# Patient Record
Sex: FEMALE | Race: WHITE | Employment: UNEMPLOYED | URBAN - METROPOLITAN AREA
[De-identification: names, ages, dates, MRNs, and addresses within clinical notes are randomized per-mention and may not be internally consistent; named-entity substitution may affect disease eponyms.]

---

## 2017-02-15 DIAGNOSIS — F64.0 GENDER DYSPHORIA IN ADULT: Primary | ICD-10-CM

## 2017-02-15 NOTE — TELEPHONE ENCOUNTER
Controlled Substance Assessment and Plan  Indication: gender dysphoria  Medication and dose: Testosterone as listed in the medication list    Ready to print out and sing.     Gabriela Valdivia MD  [facility],  G2 Family Medicine Resident  #[id number]

## 2017-02-15 NOTE — TELEPHONE ENCOUNTER
Request for medication refill:    Date of last visit at clinic: 11-14-16    Please complete refill if appropriate and CLOSE ENCOUNTER.    Closing the encounter signifies the refill is complete.    If refill has been denied, please complete the smart phrase .smirefuse and route it to the [facility] RN TRIAGE pool to inform the patient and the pharmacy.    Cristina Mckeon

## 2017-02-16 ENCOUNTER — TELEPHONE (OUTPATIENT)
Dept: FAMILY MEDICINE | Facility: CLINIC | Age: 32
End: 2017-02-16

## 2017-02-16 RX ORDER — TESTOSTERONE CYPIONATE 200 MG/ML
80 INJECTION, SOLUTION INTRAMUSCULAR WEEKLY
Qty: 7 ML | Refills: 1 | Status: SHIPPED | OUTPATIENT
Start: 2017-02-16 | End: 2017-08-25

## 2017-02-16 NOTE — TELEPHONE ENCOUNTER
[facility] phone call message- patient requesting a refill:    Full Medication Name: testosterone cypionate (DEPOTESTOTERONE CYPIONATE) 200 MG/ML injection    Dose: Sig: Inject 0.4 mLs (80 mg) into the muscle once a week    Pharmacy confirmed as   [facility] - [place] - [address] [place] [address]  [address] [facility]  [place] [address]  Phone: [phone] Fax: [phone]  : Yes    Additional Comments: Patient requesting above refill.  States pharmacy was unable to fill prescription, and that they are unable to schedule a follow-up appointment at this time due to inactive insurance.  If refill is denied, patient would like a telephone call.    OK to leave a message on voice mail? Yes    Primary language: English      needed? No    Call taken on February 16, 2017 at 12:46 PM by Elizabeth Burton

## 2017-02-16 NOTE — TELEPHONE ENCOUNTER
Medication approved by PCP, script printed for preceptor, Dr. GABRIELLE Sanchez, to sign. Faxed to patient's pharmacy, fax successful.    Leslie Grove RN

## 2017-04-10 ENCOUNTER — OFFICE VISIT (OUTPATIENT)
Dept: FAMILY MEDICINE | Facility: CLINIC | Age: 32
End: 2017-04-10

## 2017-04-10 VITALS
HEART RATE: 83 BPM | SYSTOLIC BLOOD PRESSURE: 131 MMHG | OXYGEN SATURATION: 91 % | HEIGHT: 70 IN | BODY MASS INDEX: 23.34 KG/M2 | DIASTOLIC BLOOD PRESSURE: 94 MMHG | WEIGHT: 163 LBS | TEMPERATURE: 97.6 F | RESPIRATION RATE: 16 BRPM

## 2017-04-10 DIAGNOSIS — F12.20 CANNABIS DEPENDENCE (H): ICD-10-CM

## 2017-04-10 DIAGNOSIS — I10 BENIGN ESSENTIAL HYPERTENSION: ICD-10-CM

## 2017-04-10 DIAGNOSIS — L70.0 ACNE VULGARIS: ICD-10-CM

## 2017-04-10 DIAGNOSIS — F64.0 GENDER DYSPHORIA IN ADULT: Primary | ICD-10-CM

## 2017-04-10 LAB
BUN SERPL-MCNC: 13.5 MG/DL (ref 7–19)
CALCIUM SERPL-MCNC: 9.6 MG/DL (ref 8.5–10.1)
CHLORIDE SERPLBLD-SCNC: 102.1 MMOL/L (ref 98–110)
CO2 SERPL-SCNC: 30.7 MMOL/L (ref 20–32)
CREAT SERPL-MCNC: 1 MG/DL (ref 0.5–1)
GFR SERPL CREATININE-BSD FRML MDRD: 69.2 ML/MIN/1.7 M2
GLUCOSE SERPL-MCNC: 94.1 MG'DL (ref 70–99)
POTASSIUM SERPL-SCNC: 4 MMOL/DL (ref 3.3–4.5)
SODIUM SERPL-SCNC: 138.5 MMOL/L (ref 132.6–141.4)

## 2017-04-10 NOTE — PROGRESS NOTES
Preceptor Attestation:   Patient seen and discussed with the resident. Assessment and plan reviewed with resident and agreed upon.   Supervising Physician:  Tera Troy MD  [facility]

## 2017-04-10 NOTE — PROGRESS NOTES
"          ANABEL Cruz is a 31 year old individual that uses pronouns He/Him/His/Himself that presents today for follow up of:  masculinizing hormone therapy. Gender identity: male    Any special concerns today?  concerns about acne. Has breakouts mostly on the face which is very bothersome.     On hormones?  YES +++ Shot day of the week? Tuesday      Due for labs?  Yes      +++ Refills of meds needed?  No  ---    History reviewed. No pertinent surgical history.    Patient Active Problem List   Diagnosis     Gender dysphoria     Benign essential hypertension     Hx of substance abuse       Current Outpatient Prescriptions   Medication Sig Dispense Refill     testosterone cypionate (DEPOTESTOTERONE CYPIONATE) 200 MG/ML injection Inject 0.4 mLs (80 mg) into the muscle once a week 7 mL 1     Needle, Disp, 25G X 1-1/2\" MISC Use once weekly for administering hormone IM 25 each 3     needle, disp, 18G X 1\" MISC Use to draw up hormones once weekly 25 each 3     syringe, disposable, 1 ML MISC Use once weekly to draw up hormones 25 each 3     clindamycin-benzoyl peroxide (BENZACLIN) gel Apply topically 2 times daily 50 g 11     Syringe/Needle, Disp, 18G X 1\" 1 ML MISC Use weekly in draw up testosterone 20 each 0     Syringe/Needle, Disp, 22G X 1-1/2\" 1 ML MISC 1 each once a week 20 each 0     clotrimazole (LOTRIMIN) 1 % cream Apply to affected area two times daily. Also apply one week after lesions have disappeared. 15 g 0     amLODIPine (NORVASC) 5 MG tablet Take 1 tablet (5 mg) by mouth daily 30 tablet 2     Sharps Container MISC 1 each once a week 1 each 0       History   Smoking Status     Current Every Day Smoker   Smokeless Tobacco     Not on file        No Known Allergies    Problem, Medication and Allergy Lists were reviewed and updated if needed.         Review of Systems:      General    Fat redistribution: YES- lost a lot of fat tissue    Weight change: YES- minimal weigh loss HEENT    Voice change: YES " "    Cardiovascular (CV)    Chest Pains: no    Shortness of breath: no Chest    Decreased exercise tolerance:  no    Breast changes/development: YES- smaller, wishing top surgery soon     Gastrointestinal (GI)    Abdominal pain: no    Change in appetite: no Skin    Acne or oily skin: YES    Change in hair: no     Genitourinary ()    Abnormal vaginal bleeding: no     Decreased spontaneous erections: not applicable    Change in libido: YES- increased    New sexual partners: no Musculoskeletal    Leg pain or swelling: no     Psychiatric (Psych)    Depression: no    Anxiety/Panic: no    Mood:  \"good\"                    Physical Exam:     Vitals:    04/10/17 1459   BP: (!) 131/94   Pulse: 83   Resp: 16   Temp: 97.6  F (36.4  C)   TempSrc: Oral   SpO2: 91%   Weight: 163 lb (73.9 kg)   Height: 5' 10\" (177.8 cm)     BMI= Body mass index is 23.39 kg/(m^2).   Wt Readings from Last 10 Encounters:   04/10/17 163 lb (73.9 kg)   11/14/16 165 lb 12.8 oz (75.2 kg)   08/19/16 162 lb 3.2 oz (73.6 kg)   04/18/16 172 lb 9.6 oz (78.3 kg)   01/26/16 164 lb 9.6 oz (74.7 kg)   01/22/16 168 lb 3.2 oz (76.3 kg)   01/07/16 161 lb 12.8 oz (73.4 kg)     Appearance: Male appearance and dress    GENERAL: healthy, alert and no distress  RESP: lungs clear to auscultation - no rales, no rhonchi, no wheezes  CV: regular rates and rhythm, normal S1 S2, no S3 or S4 and no murmur, no click or rub -  ABDOMEN: soft, no tenderness, no  hepatosplenomegaly, no masses, normal bowel sounds  SKIN: acne, mild, affecting lower face and neck  Affect: Appropriate/mood-congruent         Labs:   Follow up labs are ordered.     Assessment and Plan     1. Gender dysphoria in adult  - Basic Metabolic Panel ([facility])  - continue with current treatment  - follow up in 3 months    2. Hypertension, improving:   - continue with Amlodipine 5 mg daily    3. Marijuana dependence:   - encourage to quit  - Cash is not ready yet.     4. Acne, mild to moderate:  - continue to use " clindamycin-benzoyl peroxide (BENZACLIN) gel   - may use salicylic acid containing product for cleaning (OTC)        Contraception:   not needed, sexually active with cis-female     Counselled patient about controlled substances: Yes.     Follow up:  Follow up in 3 months.  Results by mychart  Questions were elicited and answered.     Gabriela Valdivia MD  [facility],  G2 Family Medicine Resident  #[id number]

## 2017-04-10 NOTE — MR AVS SNAPSHOT
After Visit Summary   4/10/2017    Cristina Morrow    MRN: [id number]           Patient Information     Date Of Birth          1985        Visit Information        Provider Department      4/10/2017 3:00 PM Gabriela Valdivia MD [facility]        Today's Diagnoses     Gender dysphoria in adult    -  1    Benign essential hypertension        Cannabis dependence (H)        Acne vulgaris           Follow-ups after your visit        Follow-up notes from your care team     Return in about 3 months (around 7/10/2017).      Who to contact     Please call your clinic at [phone] to:    Ask questions about your health    Make or cancel appointments    Discuss your medicines    Learn about your test results    Speak to your doctor   If you have compliments or concerns about an experience at your clinic, or if you wish to file a complaint, please contact [facility] Patient Relations at [phone] or email us at [email or web address]         Additional Information About Your Visit        MyChart Information     [employer] gives you secure access to your electronic health record. If you see a primary care provider, you can also send messages to your care team and make appointments. If you have questions, please call your primary care clinic.  If you do not have a primary care provider, please call [phone] and they will assist you.      [employer] is an electronic gateway that provides easy, online access to your medical records. With [employer], you can request a clinic appointment, read your test results, renew a prescription or communicate with your care team.     To access your existing account, please contact your [facility] or call [phone] for assistance.        Care EveryWhere ID     This is your Care EveryWhere ID. This could be used by other organizations to access your [facility]  "records  [id number]        Your Vitals Were     Pulse Temperature Respirations Height Pulse Oximetry Breastfeeding?    83 97.6  F (36.4  C) (Oral) 16 5' 10\" (177.8 cm) 91% No    BMI (Body Mass Index)                   23.39 kg/m2            Blood Pressure from Last 3 Encounters:   04/10/17 (!) 131/94   11/14/16 133/89   08/19/16 148/81    Weight from Last 3 Encounters:   04/10/17 163 lb (73.9 kg)   11/14/16 165 lb 12.8 oz (75.2 kg)   08/19/16 162 lb 3.2 oz (73.6 kg)              We Performed the Following     Basic Metabolic Panel ([facility])        Primary Care Provider Office Phone # Fax #    Gabriela Valdivia [phone]       [facility] [address] [facility] [address]        Thank you!     Thank you for choosing [facility]  for your care. Our goal is always to provide you with excellent care. Hearing back from our patients is one way we can continue to improve our services. Please take a few minutes to complete the written survey that you may receive in the mail after your visit with us. Thank you!             Your Updated Medication List - Protect others around you: Learn how to safely use, store and throw away your medicines at www.disposemymeds.org.          This list is accurate as of: 4/10/17 11:59 PM.  Always use your most recent med list.                   Brand Name Dispense Instructions for use    amLODIPine 5 MG tablet    NORVASC    30 tablet    Take 1 tablet (5 mg) by mouth daily       clindamycin-benzoyl peroxide gel    BENZACLIN    50 g    Apply topically 2 times daily       clotrimazole 1 % cream    LOTRIMIN    15 g    Apply to affected area two times daily. Also apply one week after lesions have disappeared.       needle (disp) 18G X 1\" Misc     25 each    Use to draw up hormones once weekly       Needle (Disp) 25G X 1-1/2\" Misc     25 each    Use once weekly for administering hormone IM       Sharps Container Misc     1 each    1 each " "once a week       syringe (disposable) 1 ML Misc     25 each    Use once weekly to draw up hormones       * Syringe/Needle (Disp) 18G X 1\" 1 ML Misc     20 each    Use weekly in draw up testosterone       * Syringe/Needle (Disp) 22G X 1-1/2\" 1 ML Misc     20 each    1 each once a week       testosterone cypionate 200 MG/ML injection    DEPOTESTOTERONE CYPIONATE    7 mL    Inject 0.4 mLs (80 mg) into the muscle once a week       * Notice:  This list has 2 medication(s) that are the same as other medications prescribed for you. Read the directions carefully, and ask your doctor or other care provider to review them with you.      "

## 2017-08-25 ENCOUNTER — OFFICE VISIT (OUTPATIENT)
Dept: FAMILY MEDICINE | Facility: CLINIC | Age: 32
End: 2017-08-25

## 2017-08-25 VITALS
RESPIRATION RATE: 16 BRPM | TEMPERATURE: 97.6 F | DIASTOLIC BLOOD PRESSURE: 77 MMHG | BODY MASS INDEX: 25 KG/M2 | HEART RATE: 51 BPM | WEIGHT: 174.2 LBS | OXYGEN SATURATION: 99 % | SYSTOLIC BLOOD PRESSURE: 131 MMHG

## 2017-08-25 DIAGNOSIS — N30.01 ACUTE CYSTITIS WITH HEMATURIA: ICD-10-CM

## 2017-08-25 DIAGNOSIS — F64.0 GENDER DYSPHORIA IN ADULT: ICD-10-CM

## 2017-08-25 DIAGNOSIS — R10.2 VAGINAL PAIN: Primary | ICD-10-CM

## 2017-08-25 DIAGNOSIS — F19.11 HX OF SUBSTANCE ABUSE (H): ICD-10-CM

## 2017-08-25 LAB
% GRANULOCYTES: 71.7 %G (ref 40–75)
ALBUMIN SERPL-MCNC: 4.4 MG/DL (ref 3.8–5)
ALP SERPL-CCNC: 59.1 U/L (ref 31.7–110.5)
ALT SERPL-CCNC: 9.5 U/L (ref 0–45)
AST SERPL-CCNC: 19.4 U/L (ref 0–45)
BACTERIA: NORMAL
BILIRUB SERPL-MCNC: 0.4 MG/DL (ref 0.2–1.3)
BILIRUBIN DIRECT: 0.2 MG/DL (ref 0.1–0.3)
BILIRUBIN UR: NEGATIVE
BLOOD UR: ABNORMAL
CASTS: NORMAL /LPF
CHOLEST SERPL-MCNC: 169.2 MG/DL (ref 0–200)
CHOLEST/HDLC SERPL: 2.5 {RATIO} (ref 0–5)
CRYSTAL URINE: NORMAL /LPF
EPITHELIAL CELLS UR: NORMAL /LPF (ref 0–2)
GLUCOSE SERPL-MCNC: 94 MG'DL (ref 70–99)
GLUCOSE URINE: NEGATIVE
GRANULOCYTES #: 6.7 K/UL (ref 1.6–8.3)
HCT VFR BLD AUTO: 44.8 % (ref 35–47)
HDLC SERPL-MCNC: 67.4 MG/DL
HEMOGLOBIN: 14.6 G/DL (ref 11.7–15.7)
KETONES UR QL: NEGATIVE
LDLC SERPL CALC-MCNC: 76 MG/DL (ref 0–129)
LEUKOCYTE ESTERASE UR: ABNORMAL
LYMPHOCYTES # BLD AUTO: 2 K/UL (ref 0.8–5.3)
LYMPHOCYTES NFR BLD AUTO: 21.3 %L (ref 20–48)
MCH RBC QN AUTO: 32.4 PG (ref 26.5–35)
MCHC RBC AUTO-ENTMCNC: 32.6 G/DL (ref 32–36)
MCV RBC AUTO: 99.6 FL (ref 78–100)
MID #: 0.7 K/UL (ref 0–2.2)
MID %: 7 %M (ref 0–20)
MUCOUS URINE: NORMAL LPF
NITRITE UR QL STRIP: NEGATIVE
PH UR STRIP: 8.5 [PH] (ref 5–7)
PLATELET # BLD AUTO: 311 K/UL (ref 150–450)
PROT SERPL-MCNC: 6.8 G/DL (ref 6.8–8.8)
PROTEIN UR: ABNORMAL
RBC # BLD AUTO: 4.5 M/UL (ref 3.8–5.2)
RBC URINE: NORMAL /HPF
SP GR UR STRIP: 1.02
TRIGL SERPL-MCNC: 129.3 MG/DL (ref 0–150)
UROBILINOGEN UR STRIP-ACNC: ABNORMAL
VLDL CHOLESTEROL: 25.9 MG/DL (ref 7–32)
WBC # BLD AUTO: 9.4 K/UL (ref 4–11)
WBC URINE: <2 /HPF

## 2017-08-25 RX ORDER — TESTOSTERONE CYPIONATE 200 MG/ML
80 INJECTION, SOLUTION INTRAMUSCULAR WEEKLY
Qty: 7 ML | Refills: 1 | Status: SHIPPED | OUTPATIENT
Start: 2017-08-25 | End: 2017-08-29

## 2017-08-25 RX ORDER — NITROFURANTOIN 25; 75 MG/1; MG/1
100 CAPSULE ORAL 2 TIMES DAILY
Qty: 10 CAPSULE | Refills: 0 | Status: SHIPPED | OUTPATIENT
Start: 2017-08-25 | End: 2017-08-30

## 2017-08-25 NOTE — PROGRESS NOTES
Preceptor Attestation:   Patient seen and discussed with the resident. Assessment and plan reviewed with resident and agreed upon.   Supervising Physician:  Micha Dai MD  [facility]

## 2017-08-25 NOTE — PROGRESS NOTES
"          HPI   Anthony is a 32 year old individual that uses pronouns He/Him/His/Himself that presents today for follow up of:  masculinizing hormone therapy. Gender identity: male.    Any special concerns today?  no current concerns    Anthony reports that he and his girlfriend underwent outpatient ChemDep treatment for Meth. Anthony reports that he was using Meth since he was 20 yo mad since he is 23 yo he was using it daily up to 3 times. Also reports using it in the past thru many orifices (vagina, anus). Anthony reports that he is sober for 3 moths now. He states that his skin has been improved and that he is not having any vaginal bleedings anymore.   He also was not taking Amlodipine for at least one month.     In addition Anthony has h/o freuqency and urgency for 1 week. He thinks that he might have an UTI. No h/o UTI in the past. Anthony is using syringes to be able to urinate standing so he thinks that this could contribute to the problem.     On hormones?  YES +++ Shot day of the week? Tuesday      Due for labs?  Yes      +++ Refills of meds needed?  Yes  ---    No past surgical history on file.    Patient Active Problem List   Diagnosis     Gender dysphoria     Benign essential hypertension     Hx of substance abuse     Cannabis dependence (H)     Acne vulgaris       Current Outpatient Prescriptions   Medication Sig Dispense Refill     testosterone cypionate (DEPOTESTOTERONE CYPIONATE) 200 MG/ML injection Inject 0.4 mLs (80 mg) into the muscle once a week 7 mL 1     Needle, Disp, 25G X 1-1/2\" MISC Use once weekly for administering hormone IM 25 each 3     needle, disp, 18G X 1\" MISC Use to draw up hormones once weekly 25 each 3     syringe, disposable, 1 ML MISC Use once weekly to draw up hormones 25 each 3     Syringe/Needle, Disp, 18G X 1\" 1 ML MISC Use weekly in draw up testosterone 20 each 0     Syringe/Needle, Disp, 22G X 1-1/2\" 1 ML MISC 1 each once a week 20 each 0     Sharps Container MISC 1 each once a week 1 " "each 0     clindamycin-benzoyl peroxide (BENZACLIN) gel Apply topically 2 times daily (Patient not taking: Reported on 8/25/2017) 50 g 11     clotrimazole (LOTRIMIN) 1 % cream Apply to affected area two times daily. Also apply one week after lesions have disappeared. (Patient not taking: Reported on 8/25/2017) 15 g 0     amLODIPine (NORVASC) 5 MG tablet Take 1 tablet (5 mg) by mouth daily 30 tablet 2       History   Smoking Status     Current Every Day Smoker   Smokeless Tobacco     Never Used        No Known Allergies    Problem, Medication and Allergy Lists were reviewed and updated if needed.         Review of Systems:      General    Fat redistribution: no    Weight change: no HEENT    Voice change: no new changes     Cardiovascular (CV)    Chest Pains: no    Shortness of breath: no Chest    Decreased exercise tolerance:  no    Breast changes/development: no     Gastrointestinal (GI)    Abdominal pain: no    Change in appetite: no Skin    Acne or oily skin: no    Change in hair: no     Genitourinary ()    Abnormal vaginal bleeding: no     Decreased spontaneous erections: not applicable    Change in libido: no    New sexual partners: no Musculoskeletal    Leg pain or swelling: no     Psychiatric (Psych)    Depression: no    Anxiety/Panic: no    Mood:  \"good\"                    Physical Exam:     Vitals:    08/25/17 1339   BP: 131/77   Pulse: 51   Resp: 16   Temp: 97.6  F (36.4  C)   TempSrc: Oral   SpO2: 99%   Weight: 174 lb 3.2 oz (79 kg)     BMI= Body mass index is 25 kg/(m^2).   Wt Readings from Last 10 Encounters:   08/25/17 174 lb 3.2 oz (79 kg)   04/10/17 163 lb (73.9 kg)   11/14/16 165 lb 12.8 oz (75.2 kg)   08/19/16 162 lb 3.2 oz (73.6 kg)   04/18/16 172 lb 9.6 oz (78.3 kg)   01/26/16 164 lb 9.6 oz (74.7 kg)   01/22/16 168 lb 3.2 oz (76.3 kg)   01/07/16 161 lb 12.8 oz (73.4 kg)     Appearance: Male appearance and dress    GENERAL: healthy, alert and no distress  RESP: lungs clear to auscultation - no " rales, no rhonchi, no wheezes  CV: regular rates and rhythm, normal S1 S2, no S3 or S4 and no murmur, no click or rub -  MS: extremities normal- no gross deformities noted, no edema  SKIN: no suspicious lesions, no rashes  Affect: Appropriate/mood-congruent but still somewhat agitated         Labs:     Follow up labs obtained as well as UA with Micro if positive.   Assessment and Plan     1. UTI  - will treat with Macrobid for 5 days    2. Gender dysphoria in adult  - continue with current regimen however might decrease the dose again since we know why Cruz had vaginal bleedings (Meth related). Will await Testosterone level.     3. General screening:   - no PAP smear in the history - needs to be performed at some point.   - Tetanus shot is needed (no immunizations on file at all)    Contraception:   not needed     Counselled patient about controlled substances: Yes.    Follow up:  Follow up in 3 months.  Results by mychart  Questions were elicited and answered.     Gabriela Valdivia MD  [facility],  PGY-3 Family Medicine Resident  #[id number]

## 2017-08-25 NOTE — MR AVS SNAPSHOT
After Visit Summary   8/25/2017    Cristina Morrow    MRN: [id number]           Patient Information     Date Of Birth          1985        Visit Information        Provider Department      8/25/2017 1:40 PM Gabriela Valdivia MD [facility]        Today's Diagnoses     Vaginal pain    -  1    Gender dysphoria in adult        Acute cystitis with hematuria        Hx of substance abuse           Follow-ups after your visit        Follow-up notes from your care team     Return in about 3 months (around 11/25/2017).      Who to contact     Please call your clinic at [phone] to:    Ask questions about your health    Make or cancel appointments    Discuss your medicines    Learn about your test results    Speak to your doctor   If you have compliments or concerns about an experience at your clinic, or if you wish to file a complaint, please contact [facility] Patient Relations at [phone] or email us at [email or web address]         Additional Information About Your Visit        [employer] Information     [employer] gives you secure access to your electronic health record. If you see a primary care provider, you can also send messages to your care team and make appointments. If you have questions, please call your primary care clinic.  If you do not have a primary care provider, please call [phone] and they will assist you.      [employer] is an electronic gateway that provides easy, online access to your medical records. With [employer], you can request a clinic appointment, read your test results, renew a prescription or communicate with your care team.     To access your existing account, please contact your [facility] or call [phone] for assistance.        Care EveryWhere ID     This is your Care EveryWhere ID. This could be used by other organizations to access your [facility] medical records  [id number]         Your Vitals Were     Pulse Temperature Respirations Pulse Oximetry BMI (Body Mass Index)       51 97.6  F (36.4  C) (Oral) 16 99% 25 kg/m2        Blood Pressure from Last 3 Encounters:   08/25/17 131/77   04/10/17 (!) 131/94   11/14/16 133/89    Weight from Last 3 Encounters:   08/25/17 174 lb 3.2 oz (79 kg)   04/10/17 163 lb (73.9 kg)   11/14/16 165 lb 12.8 oz (75.2 kg)              We Performed the Following     CBC with Diff Plt     Glucose     Hepatic Panel     Lipid Cascade     Testosterone Total     Urinalysis, Micro If (UA) ([facility])     Urine Microscopic (UA) ([facility])          Today's Medication Changes          These changes are accurate as of: 8/25/17 11:59 PM.  If you have any questions, ask your nurse or doctor.               Start taking these medicines.        Dose/Directions    nitroFURantoin (macrocrystal-monohydrate) 100 MG capsule   Commonly known as:  MACROBID   Used for:  Acute cystitis with hematuria   Started by:  Gabriela Valdivia MD        Dose:  100 mg   Take 1 capsule (100 mg) by mouth 2 times daily for 5 days   Quantity:  10 capsule   Refills:  0         Stop taking these medicines if you haven't already. Please contact your care team if you have questions.     amLODIPine 5 MG tablet   Commonly known as:  NORVASC   Stopped by:  Gabriela Valdivia MD                Where to get your medicines      These medications were sent to [facility] [place], [place] - [address] [facility]  [address] [facility] [address]     Phone:  [phone]     nitroFURantoin (macrocrystal-monohydrate) 100 MG capsule         Some of these will need a paper prescription and others can be bought over the counter.  Ask your nurse if you have questions.     Bring a paper prescription for each of these medications     testosterone cypionate 200 MG/ML injection                Primary Care Provider Office Phone # Fax #    Gabriela Valdivia [phone]       [facility]  "FAMILY [facility] [address] [facility] [address]        Equal Access to Services     ESDRASZULMA EDDIE : Hadii aad sarah radha Damicoali, wajennda luqadaha, qaanabelleta kaсветланаsandi marcusuyensandi, kate gaona ghislainekenya knowlesmikebetzaida cordoba. So [facility] [phone].    ATENCIÓN: Si habla español, tiene a villeda disposición servicios gratuitos de asistencia lingüística. [facility] [phone].    We comply with applicable federal civil rights laws and [place] laws. We do not discriminate on the basis of race, color, national origin, age, disability sex, sexual orientation or gender identity.            Thank you!     Thank you for choosing [facility]  for your care. Our goal is always to provide you with excellent care. Hearing back from our patients is one way we can continue to improve our services. Please take a few minutes to complete the written survey that you may receive in the mail after your visit with us. Thank you!             Your Updated Medication List - Protect others around you: Learn how to safely use, store and throw away your medicines at www.disposemymeds.org.          This list is accurate as of: 8/25/17 11:59 PM.  Always use your most recent med list.                   Brand Name Dispense Instructions for use Diagnosis    clindamycin-benzoyl peroxide gel    BENZACLIN    50 g    Apply topically 2 times daily    Acne vulgaris       clotrimazole 1 % cream    LOTRIMIN    15 g    Apply to affected area two times daily. Also apply one week after lesions have disappeared.    Tinea corporis       needle (disp) 18G X 1\" Misc     25 each    Use to draw up hormones once weekly    Gender dysphoria in adult       Needle (Disp) 25G X 1-1/2\" Misc     25 each    Use once weekly for administering hormone IM    Gender dysphoria in adult       nitroFURantoin (macrocrystal-monohydrate) 100 MG capsule    MACROBID    10 capsule    Take 1 capsule (100 mg) by mouth 2 times daily for 5 days    Acute cystitis with hematuria    " "   Sharps Container Misc     1 each    1 each once a week    Gender dysphoria       syringe (disposable) 1 ML Misc     25 each    Use once weekly to draw up hormones    Gender dysphoria in adult       * Syringe/Needle (Disp) 18G X 1\" 1 ML Misc     20 each    Use weekly in draw up testosterone    Gender dysphoria in adult       * Syringe/Needle (Disp) 22G X 1-1/2\" 1 ML Misc     20 each    1 each once a week    Gender dysphoria in adult       testosterone cypionate 200 MG/ML injection    DEPOTESTOTERONE    7 mL    Inject 0.4 mLs (80 mg) into the muscle once a week    Gender dysphoria in adult       * Notice:  This list has 2 medication(s) that are the same as other medications prescribed for you. Read the directions carefully, and ask your doctor or other care provider to review them with you.      "

## 2017-08-27 PROBLEM — N39.0 URINARY TRACT INFECTION: Status: ACTIVE | Noted: 2017-08-27

## 2017-08-29 DIAGNOSIS — F64.0 GENDER DYSPHORIA IN ADULT: ICD-10-CM

## 2017-08-29 LAB — TESTOST SERPL-MCNC: 1097 NG/DL (ref 8–60)

## 2017-08-29 RX ORDER — TESTOSTERONE CYPIONATE 200 MG/ML
70 INJECTION, SOLUTION INTRAMUSCULAR WEEKLY
Qty: 7 ML | Refills: 1 | Status: SHIPPED | OUTPATIENT
Start: 2017-08-29 | End: 2017-12-18

## 2017-08-29 NOTE — TELEPHONE ENCOUNTER
RN attempted to reach patient, phone said they were not accepting calls at this time. Will attempt later. RN Printed script and had preceptor on site, Dr. Ivan, sign and walked over to [facility]    Mariam Crowder RN

## 2017-08-29 NOTE — TELEPHONE ENCOUNTER
Testosterone level is above 1000. No vaginal bleeding anymore per patient. Will reduce dose to 70mg of testosterone weekly. Follow up in 3 months as we discussed.  New prescription is ready to print and sign.      Gabriela Valdivia MD  [facility],  PGY-3 Family Medicine Resident  #[id number]

## 2017-08-29 NOTE — TELEPHONE ENCOUNTER
Request for medication refill:    Date of last visit at clinic: 8-25-17    Please complete refill if appropriate and CLOSE ENCOUNTER.    Closing the encounter signifies the refill is complete.    If refill has been denied, please complete the smart phrase .smirefuse and route it to the [facility] RN TRIAGE pool to inform the patient and the pharmacy.    Eli Duckworth MA

## 2017-08-29 NOTE — TELEPHONE ENCOUNTER
RN attempted to reach patient but was unable to, phone not accepting calls. Will send My Chart message    Mariam Crowder RN

## 2017-12-18 ENCOUNTER — OFFICE VISIT (OUTPATIENT)
Dept: FAMILY MEDICINE | Facility: CLINIC | Age: 32
End: 2017-12-18
Payer: COMMERCIAL

## 2017-12-18 VITALS
SYSTOLIC BLOOD PRESSURE: 157 MMHG | HEART RATE: 76 BPM | BODY MASS INDEX: 23.77 KG/M2 | WEIGHT: 166 LBS | TEMPERATURE: 97.4 F | OXYGEN SATURATION: 98 % | HEIGHT: 70 IN | RESPIRATION RATE: 16 BRPM | DIASTOLIC BLOOD PRESSURE: 100 MMHG

## 2017-12-18 DIAGNOSIS — F19.11 HX OF SUBSTANCE ABUSE (H): ICD-10-CM

## 2017-12-18 DIAGNOSIS — I10 BENIGN ESSENTIAL HYPERTENSION: ICD-10-CM

## 2017-12-18 DIAGNOSIS — F64.0 GENDER DYSPHORIA IN ADULT: Primary | ICD-10-CM

## 2017-12-18 LAB
% GRANULOCYTES: 63.3 %G (ref 40–75)
ALBUMIN SERPL-MCNC: 4.7 MG/DL (ref 3.8–5)
ALP SERPL-CCNC: 66.1 U/L (ref 31.7–110.5)
ALT SERPL-CCNC: 25.8 U/L (ref 0–45)
AMPHETAMINES QUAL: NEGATIVE
AST SERPL-CCNC: 23.5 U/L (ref 0–45)
BARBITURATES QUAL URINE: NEGATIVE
BENZODIAZEPINE QUAL URINE: NEGATIVE
BILIRUB SERPL-MCNC: 0.4 MG/DL (ref 0.2–1.3)
BILIRUBIN DIRECT: 0.1 MG/DL (ref 0.1–0.3)
BUPRENORPHINE QUAL URINE: NEGATIVE
CANNABINOIDS UR QL SCN: POSITIVE
CHOLEST SERPL-MCNC: 221 MG/DL (ref 0–200)
CHOLEST/HDLC SERPL: 3.3 {RATIO} (ref 0–5)
COCAINE QUAL URINE: NEGATIVE
GRANULOCYTES #: 4.2 K/UL (ref 1.6–8.3)
HBA1C MFR BLD: 5.2 % (ref 4.1–5.7)
HCT VFR BLD AUTO: 48.4 % (ref 35–47)
HDLC SERPL-MCNC: 68 MG/DL
HEMOGLOBIN: 15.6 G/DL (ref 11.7–15.7)
LDLC SERPL CALC-MCNC: 124 MG/DL (ref 0–129)
LYMPHOCYTES # BLD AUTO: 1.8 K/UL (ref 0.8–5.3)
LYMPHOCYTES NFR BLD AUTO: 27.9 %L (ref 20–48)
MCH RBC QN AUTO: 31.8 PG (ref 26.5–35)
MCHC RBC AUTO-ENTMCNC: 32.2 G/DL (ref 32–36)
MCV RBC AUTO: 98.7 FL (ref 78–100)
METHAMPHETAMINE: NEGATIVE
METHODONE QUAL: NEGATIVE
MID #: 0.6 K/UL (ref 0–2.2)
MID %: 8.8 %M (ref 0–20)
MORPHINE QUAL: NEGATIVE
OXYCODONE QUAL: NEGATIVE
PLATELET # BLD AUTO: 302 K/UL (ref 150–450)
PROT SERPL-MCNC: 7.8 G/DL (ref 6.8–8.8)
RBC # BLD AUTO: 4.9 M/UL (ref 3.8–5.2)
TEMPERATURE OF URINE WAS BETWEEN 90-100 DEGREES F: YES
TRIGL SERPL-MCNC: 145.7 MG/DL (ref 0–150)
VLDL CHOLESTEROL: 29.1 MG/DL (ref 7–32)
WBC # BLD AUTO: 6.6 K/UL (ref 4–11)

## 2017-12-18 RX ORDER — TESTOSTERONE CYPIONATE 200 MG/ML
60 INJECTION, SOLUTION INTRAMUSCULAR WEEKLY
Qty: 7 ML | Refills: 1 | COMMUNITY
Start: 2017-12-18 | End: 2018-03-15

## 2017-12-18 RX ORDER — AMLODIPINE BESYLATE 5 MG/1
5 TABLET ORAL DAILY
Qty: 30 TABLET | Refills: 2 | Status: SHIPPED | OUTPATIENT
Start: 2017-12-18 | End: 2018-03-27

## 2017-12-18 NOTE — MR AVS SNAPSHOT
After Visit Summary   12/18/2017    Cristina Morrow    MRN: [id number]           Patient Information     Date Of Birth          1985        Visit Information        Provider Department      12/18/2017 1:40 PM Gabriela Valdivia MD [facility]        Today's Diagnoses     Gender dysphoria in adult    -  1    Benign essential hypertension        Hx of substance abuse           Follow-ups after your visit        Follow-up notes from your care team     Return in about 4 weeks (around 1/15/2018) for BP Recheck ChemDep treatment discussion.      Who to contact     Please call your clinic at [phone] to:    Ask questions about your health    Make or cancel appointments    Discuss your medicines    Learn about your test results    Speak to your doctor   If you have compliments or concerns about an experience at your clinic, or if you wish to file a complaint, please contact [facility] Patient Relations at [phone] or email us at [email or web address]         Additional Information About Your Visit        MyChart Information     [employer] gives you secure access to your electronic health record. If you see a primary care provider, you can also send messages to your care team and make appointments. If you have questions, please call your primary care clinic.  If you do not have a primary care provider, please call [phone] and they will assist you.      [employer] is an electronic gateway that provides easy, online access to your medical records. With [employer], you can request a clinic appointment, read your test results, renew a prescription or communicate with your care team.     To access your existing account, please contact your [facility] or call [phone] for assistance.        Care EveryWhere ID     This is your Care EveryWhere ID. This could be used by other organizations to access your [facility]  "records  [id number]        Your Vitals Were     Pulse Temperature Respirations Height Pulse Oximetry BMI (Body Mass Index)    76 97.4  F (36.3  C) (Oral) 16 5' 10\" (177.8 cm) 98% 23.82 kg/m2       Blood Pressure from Last 3 Encounters:   12/18/17 (!) 157/100   08/25/17 131/77   04/10/17 (!) 131/94    Weight from Last 3 Encounters:   12/18/17 166 lb (75.3 kg)   08/25/17 174 lb 3.2 oz (79 kg)   04/10/17 163 lb (73.9 kg)              We Performed the Following     CBC with Diff Plt     Hemoglobin A1c ([facility])     Hepatic Panel     Lipid Cascade     Rapid Urine Drug Screen ([place])     Testosterone Total          Today's Medication Changes          These changes are accurate as of: 12/18/17 11:59 PM.  If you have any questions, ask your nurse or doctor.               Start taking these medicines.        Dose/Directions    amLODIPine 5 MG tablet   Commonly known as:  NORVASC   Used for:  Benign essential hypertension   Started by:  Gabriela Valdivia MD        Dose:  5 mg   Take 1 tablet (5 mg) by mouth daily   Quantity:  30 tablet   Refills:  2         These medicines have changed or have updated prescriptions.        Dose/Directions    testosterone cypionate 200 MG/ML injection   Commonly known as:  DEPOTESTOTERONE   This may have changed:  how much to take   Used for:  Gender dysphoria in adult   Changed by:  Gabriela Valdivia MD        Dose:  60 mg   Inject 0.3 mLs (60 mg) into the muscle once a week   Quantity:  7 mL   Refills:  1            Where to get your medicines      These medications were sent to [facility]  [place], [place] - [address] [place] [address] [address] [facility] [address]     Phone:  [phone]     amLODIPine 5 MG tablet                Primary Care Provider Office Phone # Fax #    Gabriela Valdivia [phone]       [facility] [address] [facility] [address]        Equal Access to Services     KAREN MORGAN AH: Oleg garcia " "Ashok, harrietda luqadaha, qaybta kaaleliane gomez, kate guallpakenya beryl. So [facility] [phone].    ATENCIÓN: Si nova mckeon, tiene a villeda disposición servicios gratuitos de asistencia lingüística. Joel al [phone].    We comply with applicable federal civil rights laws and [place] laws. We do not discriminate on the basis of race, color, national origin, age, disability, sex, sexual orientation, or gender identity.            Thank you!     Thank you for choosing [facility]  for your care. Our goal is always to provide you with excellent care. Hearing back from our patients is one way we can continue to improve our services. Please take a few minutes to complete the written survey that you may receive in the mail after your visit with us. Thank you!             Your Updated Medication List - Protect others around you: Learn how to safely use, store and throw away your medicines at www.disposemymeds.org.          This list is accurate as of: 12/18/17 11:59 PM.  Always use your most recent med list.                   Brand Name Dispense Instructions for use Diagnosis    amLODIPine 5 MG tablet    NORVASC    30 tablet    Take 1 tablet (5 mg) by mouth daily    Benign essential hypertension       clindamycin-benzoyl peroxide gel    BENZACLIN    50 g    Apply topically 2 times daily    Acne vulgaris       clotrimazole 1 % cream    LOTRIMIN    15 g    Apply to affected area two times daily. Also apply one week after lesions have disappeared.    Tinea corporis       Sharps Container Misc     1 each    1 each once a week    Gender dysphoria       * Syringe/Needle (Disp) 18G X 1\" 1 ML Misc     20 each    Use weekly in draw up testosterone    Gender dysphoria in adult       * Syringe/Needle (Disp) 22G X 1-1/2\" 1 ML Misc     20 each    1 each once a week    Gender dysphoria in adult       testosterone cypionate 200 MG/ML injection    DEPOTESTOTERONE    7 mL    Inject 0.3 mLs (60 " mg) into the muscle once a week    Gender dysphoria in adult       * Notice:  This list has 2 medication(s) that are the same as other medications prescribed for you. Read the directions carefully, and ask your doctor or other care provider to review them with you.

## 2017-12-18 NOTE — PROGRESS NOTES
Preceptor Attestation:   Patient seen and discussed with the resident. Assessment and plan reviewed with resident and agreed upon.   Supervising Physician:  Tera Troy MD  [facility]

## 2017-12-18 NOTE — PROGRESS NOTES
"          ANABEL Cruz is a 32 year old individual that uses pronouns He/Him/His/Himself that presents today for follow up of:  masculinizing hormone therapy. Gender identity: Male. Took off BP medication 4 months ago (blood pressure was within normal limits after not taking medication for one month), was taking Amlodipine before. Interested in quitting smoking cigarettes, currently smoking 5 cigarettes per day. HRT for about 2 years, feeling very grateful. Mostly off of meth, \"dabbles here and there\".  Last time used was about 2.5 weeks ago. H/o 4 ChemDep treatments in the past (including for alcohol abuse and drug use) but no inpatient treatment. Tried [facility] in the past, planning to do it again. States that because he is not injecting anything it is \"not that bad\". Content with dose. Feels hot all the time, not a new issue for him. Does not desire shots nor a pap today. Says he will set up pap and sti screen in [place].      Any special concerns today?  no current concerns    On hormones?  YES Shot day of the week? Tuesday, .3 mLs (60 mg)     Due for labs?  Yes (Hgb A1c, lipid panel, CBC with diff, hepatic panel, testosterone)    Refills of meds needed?  No maybe    No past surgical history on file.    Patient Active Problem List   Diagnosis     Gender dysphoria     Benign essential hypertension     Hx of substance abuse     Cannabis dependence (H)     Acne vulgaris     Urinary tract infection       Current Outpatient Prescriptions   Medication Sig Dispense Refill     testosterone cypionate (DEPOTESTOTERONE) 200 MG/ML injection Inject 0.35 mLs (70 mg) into the muscle once a week 7 mL 1     clindamycin-benzoyl peroxide (BENZACLIN) gel Apply topically 2 times daily (Patient not taking: Reported on 8/25/2017) 50 g 11     Syringe/Needle, Disp, 18G X 1\" 1 ML MISC Use weekly in draw up testosterone 20 each 0     Syringe/Needle, Disp, 22G X 1-1/2\" 1 ML MISC 1 each once a week 20 each 0     clotrimazole (LOTRIMIN) 1 " % cream Apply to affected area two times daily. Also apply one week after lesions have disappeared. (Patient not taking: Reported on 8/25/2017) 15 g 0     Sharps Container MISC 1 each once a week 1 each 0       History   Smoking Status     Current Every Day Smoker   Smokeless Tobacco     Never Used        No Known Allergies    Problem, Medication and Allergy Lists were reviewed and updated if needed..         Review of Systems:      General  Fat redistribution: YES, decrease in fat in flanks and thighs  Weight change: YES, has lost a few pounds HEENT  Voice change: YES, deepening, staying consistent now.     Cardiovascular (CV)    Chest Pains: no    Shortness of breath: no Chest    Decreased exercise tolerance:  no    Breast changes/development: no     Gastrointestinal (GI)    Abdominal pain: no    Change in appetite: always hungry, not a change Skin  Acne or oily skin: YES, now on chest    Change in hair: no     Genitourinary ()    Abnormal vaginal bleeding: no     Decreased spontaneous erections: not applicable    Change in libido: no    New sexual partners: no Musculoskeletal    Leg pain or swelling: no     Psychiatric (Psych)    Depression: occasional, getting better  Anxiety/Panic: YES, at baseline    Mood:  upbeat                    Physical Exam:   There were no vitals filed for this visit.  BMI= There is no height or weight on file to calculate BMI.   Wt Readings from Last 10 Encounters:   08/25/17 174 lb 3.2 oz (79 kg)   04/10/17 163 lb (73.9 kg)   11/14/16 165 lb 12.8 oz (75.2 kg)   08/19/16 162 lb 3.2 oz (73.6 kg)   04/18/16 172 lb 9.6 oz (78.3 kg)   01/26/16 164 lb 9.6 oz (74.7 kg)   01/22/16 168 lb 3.2 oz (76.3 kg)   01/07/16 161 lb 12.8 oz (73.4 kg)     Appearance: Male appearance and dress    GENERAL: alert and no distress, diaphoretic   EYES: Eyes grossly normal to inspection, PERRL  NECK: no adenopathy, no asymmetry, no masses,  and thyroid normal to palpation, supple  RESP: lungs clear to  auscultation - no rales, no rhonchi, no wheezes  CV: regular rates and rhythm, normal S1 S2, no S3 or S4 and no murmur, no click or rub -  ABDOMEN: soft, no tenderness, no  hepatosplenomegaly, no masses, normal bowel sounds  MS: extremities normal- no gross deformities noted, no edema  SKIN: no suspicious lesions, no rashes  Psych: Alert and oriented times 3; coherent but pressured speech, normal rate and volume, able to articulate logical thoughts, able to abstract reason, some tangential thoughts, no hallucinations or delusions.   LYMPHATICS: normal ant/post cervical, supraclavicular nodes  Affect: Bright           Labs:    Results from the last 24 hoursNo results found for this or any previous visit (from the past 24 hour(s)).    Assessment and Plan     ## HRT follow up:  - continue with the current dose of the Testosterone (60 mg weekly/0.3 ml)  - will adjst the dose as needed after receiving lab resutls    ## Hypertension:  - restart amlodipine  - we believe that there is no work up performed yet for secondary cause of hypertension so weill discuss it at the next visit in 1 month     ## Methamphetamine use, last time use 2.5 weeks ago  ## Cannabis use disorder  - Utox positive for cannabis  - discussed with Anthony that he has a serious problem and that he needs ChemDep, preferably inpatient. He declines it bandar this point. He wants to try [facility] again. We discussed that it is not safe to use giving his high blood pressure. Anthony will follow up in 1 month and we will discuss this issue again. We again strongly recommended inpatient ChemDep treatment which was declined at this point.      Contraception:   not needed     Counselled patient about controlled substances: Yes.    Follow up:  Follow up in 1 month for blood pressure recheck and chemical dependency follow up.   Results by mychart  Questions were elicited and answered.     Gabriela Valdivia MD  [facility],  PGY-3  Family Medicine Resident  #[id number]

## 2017-12-18 NOTE — NURSING NOTE
I have recommended that this patient have a flu shot and tetanus booster but she declines at this time.Jaymie Cooley, [facility]

## 2017-12-20 LAB — TESTOST SERPL-MCNC: 611 NG/DL (ref 8–60)

## 2017-12-31 ENCOUNTER — HEALTH MAINTENANCE LETTER (OUTPATIENT)
Age: 32
End: 2017-12-31

## 2018-01-29 ENCOUNTER — TELEPHONE (OUTPATIENT)
Dept: FAMILY MEDICINE | Facility: CLINIC | Age: 33
End: 2018-01-29

## 2018-01-29 NOTE — TELEPHONE ENCOUNTER
The patient just wanted me to send you an FYI that they recently had a dentist appt and had a normal BP reading.

## 2018-02-01 DIAGNOSIS — F64.0 GENDER DYSPHORIA IN ADULT: ICD-10-CM

## 2018-02-01 NOTE — TELEPHONE ENCOUNTER
"Date of last visit at clinic: 12/18/17 RX REQUESTS FOR 25Gx1.5\", 1ML Syringe    Please complete refill and CLOSE ENCOUNTER.  Closing the encounter signifies the refill is complete.    "

## 2018-02-08 DIAGNOSIS — F64.0 GENDER DYSPHORIA IN ADULT: Primary | ICD-10-CM

## 2018-03-14 DIAGNOSIS — F64.0 GENDER DYSPHORIA IN ADULT: ICD-10-CM

## 2018-03-14 NOTE — TELEPHONE ENCOUNTER
[facility] phone call message- patient requesting a refill:    Full Medication Name: testosterone cypionate (DEPOTESTOTERONE) 200 MG/ML injection    Dose:     Pharmacy confirmed as   [facility] [place] [address] [address]  Phone: [phone] Fax: [phone]  : Yes    Additional Comments: Patient missed this week dose     OK to leave a message on voice mail? Yes    Primary language: English      needed? No    Call taken on March 14, 2018 at 5:01 PM by Bailey Mallory

## 2018-03-15 RX ORDER — TESTOSTERONE CYPIONATE 200 MG/ML
60 INJECTION, SOLUTION INTRAMUSCULAR WEEKLY
Qty: 7 ML | Refills: 1 | Status: SHIPPED | OUTPATIENT
Start: 2018-03-15 | End: 2018-10-04

## 2018-03-15 NOTE — TELEPHONE ENCOUNTER
Please process per controlled substance protocol. I have reviewed pt's chart and this refill is appropriate. Please provide rx to preceptor to sign, then provide to patient's pharmacy and notify patient.     Gabriela Valdivia MD  [facility],  PGY-3 Family Medicine Resident  #[id number]

## 2018-03-15 NOTE — TELEPHONE ENCOUNTER
Date of last visit at clinic: 12/18/17    Medication is a controlled substance. RN cannot authorize refill without PCP approval.    Please complete refill and CLOSE ENCOUNTER.  Closing the encounter signifies the refill is complete.    Yina Quintero RN

## 2018-03-27 ENCOUNTER — OFFICE VISIT (OUTPATIENT)
Dept: FAMILY MEDICINE | Facility: CLINIC | Age: 33
End: 2018-03-27
Payer: COMMERCIAL

## 2018-03-27 VITALS
HEART RATE: 60 BPM | DIASTOLIC BLOOD PRESSURE: 88 MMHG | SYSTOLIC BLOOD PRESSURE: 127 MMHG | BODY MASS INDEX: 23.45 KG/M2 | OXYGEN SATURATION: 98 % | RESPIRATION RATE: 20 BRPM | TEMPERATURE: 97.6 F | WEIGHT: 163.4 LBS

## 2018-03-27 DIAGNOSIS — F64.0 GENDER DYSPHORIA IN ADULT: ICD-10-CM

## 2018-03-27 DIAGNOSIS — I10 BENIGN ESSENTIAL HYPERTENSION: Primary | ICD-10-CM

## 2018-03-27 RX ORDER — AMLODIPINE BESYLATE 5 MG/1
5 TABLET ORAL DAILY
Qty: 90 TABLET | Refills: 3 | Status: SHIPPED | OUTPATIENT
Start: 2018-03-27 | End: 2022-09-15

## 2018-03-27 NOTE — MR AVS SNAPSHOT
After Visit Summary   3/27/2018    Cristina Morrow    MRN: [id number]           Patient Information     Date Of Birth          1985        Visit Information        Provider Department      3/27/2018 3:40 PM Kim Mosher MD [facility]        Today's Diagnoses     Gender dysphoria in adult    -  1    Benign essential hypertension          Care Instructions    Here is the plan from today's visit    1. Benign essential hypertension  - amLODIPine (NORVASC) 5 MG tablet; Take 1 tablet (5 mg) by mouth daily  Dispense: 90 tablet; Refill: 3    2. Gender dysphoria in adult  - PLASTIC SURGERY REFERRAL - INTERNAL  [email or web address]    - Call to make an appointment for a top surgery consult and bottom surgery consult.     Please call or return to clinic if your symptoms don't go away.    Follow up plan  Make a follow up appointment in 6 months for HRT follow up.     Thank you for coming to [facility] today.  Lab Testing:  **If you had lab testing today and your results are reassuring or normal they will be mailed to you or sent through [employer] within 7 days.   **If the lab tests need quick action we will call you with the results.  The phone number we will call with results is # [phone] (home) . If this is not the best number please call our clinic and change the number.  Medication Refills:  If you need any refills please call your pharmacy and they will contact us.   If you need to  your refill at a new pharmacy, please contact the new pharmacy directly. The new pharmacy will help you get your medications transferred faster.   Scheduling:  If you have any concerns about today's visit or wish to schedule another appointment please call our office during normal business hours [phone] (8-5:00 M-F)  If a referral was made to a [facility] and you don't get a call from central scheduling  please call [phone].  If a Mammogram was ordered for you at [facility] call [phone] to schedule or change your appointment.  If you had an XRay/CT/Ultrasound/MRI ordered the number is [phone] to schedule or change your radiology appointment.   Medical Concerns:  If you have urgent medical concerns please call [phone] at any time of the day.    Kim Mosher MD            Follow-ups after your visit        Additional Services     PLASTIC SURGERY REFERRAL - INTERNAL       Your provider has referred you to: [facility]: Plastic and Reconstructive Surgery Clinic [facility] [phone]   [email or web address]    Please be aware that coverage of these services is subject to the terms and limitations of your health insurance plan.  Call member services at your health plan with any benefit or coverage questions.      Please bring the following with you to your appointment:    (1) Any X-Rays, CTs or MRIs which have been performed.  Contact the facility where they were done to arrange for  prior to your scheduled appointment.    (2) List of current medications  (3) This referral request   (4) Any documents/labs given to you for this referral                  Who to contact     Please call your clinic at [phone] to:    Ask questions about your health    Make or cancel appointments    Discuss your medicines    Learn about your test results    Speak to your doctor            Additional Information About Your Visit        [employer] Information     [employer] gives you secure access to your electronic health record. If you see a primary care provider, you can also send messages to your care team and make appointments. If you have questions, please call your primary care clinic.  If you do not have a primary care provider, please call [phone] and they will assist you.      [employer] is an electronic gateway that provides easy,  online access to your medical records. With [employer], you can request a clinic appointment, read your test results, renew a prescription or communicate with your care team.     To access your existing account, please contact your [facility] or call [phone] for assistance.        Care EveryWhere ID     This is your Care EveryWhere ID. This could be used by other organizations to access your [place] medical records  [id number]        Your Vitals Were     Pulse Temperature Respirations Pulse Oximetry BMI (Body Mass Index)       60 97.6  F (36.4  C) (Oral) 20 98% 23.45 kg/m2        Blood Pressure from Last 3 Encounters:   03/27/18 127/88   12/18/17 (!) 157/100   08/25/17 131/77    Weight from Last 3 Encounters:   03/27/18 163 lb 6.4 oz (74.1 kg)   12/18/17 166 lb (75.3 kg)   08/25/17 174 lb 3.2 oz (79 kg)              We Performed the Following     PLASTIC SURGERY REFERRAL - INTERNAL          Where to get your medicines      These medications were sent to [facility] [address] [place] [address]     Phone:  [phone]     amLODIPine 5 MG tablet          Primary Care Provider Office Phone # Fax #    Gabriela Valdivia [phone]       [facility] [address] [facility] [address]        Equal Access to Services     KAREN MORGAN AH: Hadii rodney ku hadasho Soyazan, waaxda luqadaha, qaybta kaalmada patricia, kate cordoba. So [facility] [phone].    ATENCIÓN: Si habla español, tiene a villeda disposición servicios gratuitos de asistencia lingüística. Joel al [phone].    We comply with applicable federal civil rights laws and [place] laws. We do not discriminate on the basis of race, color, national origin, age, disability, sex, sexual orientation, or gender identity.            Thank you!     Thank you for choosing [facility]  for your  "care. Our goal is always to provide you with excellent care. Hearing back from our patients is one way we can continue to improve our services. Please take a few minutes to complete the written survey that you may receive in the mail after your visit with us. Thank you!             Your Updated Medication List - Protect others around you: Learn how to safely use, store and throw away your medicines at www.disposemymeds.org.          This list is accurate as of 3/27/18  4:00 PM.  Always use your most recent med list.                   Brand Name Dispense Instructions for use Diagnosis    amLODIPine 5 MG tablet    NORVASC    90 tablet    Take 1 tablet (5 mg) by mouth daily    Benign essential hypertension       clindamycin-benzoyl peroxide gel    BENZACLIN    50 g    Apply topically 2 times daily    Acne vulgaris       clotrimazole 1 % cream    LOTRIMIN    15 g    Apply to affected area two times daily. Also apply one week after lesions have disappeared.    Tinea corporis       Needle (Disp) 25G X 1-1/2\" Misc    BD ECLIPSE    20 each    Use to inject Testosterone    Gender dysphoria in adult       Sharps Container Misc     1 each    1 each once a week    Gender dysphoria       syringe (disposable) 1 ML Misc     20 each    Use to draw up testosterone    Gender dysphoria in adult       * Syringe/Needle (Disp) 22G X 1-1/2\" 1 ML Misc     20 each    1 each once a week    Gender dysphoria in adult       * Syringe/Needle (Disp) 18G X 1\" 1 ML Misc     20 each    Use weekly in draw up testosterone    Gender dysphoria in adult       testosterone cypionate 200 MG/ML injection    DEPOTESTOTERONE    7 mL    Inject 0.3 mLs (60 mg) into the muscle once a week    Gender dysphoria in adult       * Notice:  This list has 2 medication(s) that are the same as other medications prescribed for you. Read the directions carefully, and ask your doctor or other care provider to review them with you.      "

## 2018-03-27 NOTE — PATIENT INSTRUCTIONS
Here is the plan from today's visit    1. Benign essential hypertension  - amLODIPine (NORVASC) 5 MG tablet; Take 1 tablet (5 mg) by mouth daily  Dispense: 90 tablet; Refill: 3    2. Gender dysphoria in adult  - PLASTIC SURGERY REFERRAL - INTERNAL  [email or web address]    - Call to make an appointment for a top surgery consult and bottom surgery consult.     Please call or return to clinic if your symptoms don't go away.    Follow up plan  Make a follow up appointment in 6 months for HRT follow up.     Thank you for coming to [facility] today.  Lab Testing:  **If you had lab testing today and your results are reassuring or normal they will be mailed to you or sent through [employer] within 7 days.   **If the lab tests need quick action we will call you with the results.  The phone number we will call with results is # [phone] (home) . If this is not the best number please call our clinic and change the number.  Medication Refills:  If you need any refills please call your pharmacy and they will contact us.   If you need to  your refill at a new pharmacy, please contact the new pharmacy directly. The new pharmacy will help you get your medications transferred faster.   Scheduling:  If you have any concerns about today's visit or wish to schedule another appointment please call our office during normal business hours [phone] (8-5:00 M-F)  If a referral was made to a [facility] and you don't get a call from central scheduling please call [phone].  If a Mammogram was ordered for you at [facility] call [phone] to schedule or change your appointment.  If you had an XRay/CT/Ultrasound/MRI ordered the number is [phone] to schedule or change your radiology appointment.   Medical Concerns:  If you have urgent medical concerns please call [phone] at any time of the day.    Kim Mosher MD

## 2018-03-27 NOTE — PROGRESS NOTES
Preceptor Attestation:   Patient seen, evaluated and discussed with the resident. I have verified the content of the note, which accurately reflects my assessment of the patient and the plan of care.   Supervising Physician:  Leonela Hinkle MD

## 2018-03-27 NOTE — LETTER
2018      To Whom It May Concern  Re: Cristina Morrow  : 1985     I am writing in support of my patient, Cristina Morrow (preferred name Anthony), who will be undergoing surgery:  Bilateral mastectomy, total hysterectomy, for the condition of gender dysphoria in adolescents or adults.     Anthony is under my care and I have reviewed their medical history in detail. The patient has undergone therapeutic evaluation with [facility] since . Anthony began masculinizing hormone therapy with testosterone  in 2016, and has been on this medication continuously since, with excellent clinical response.     Their initial and evolving gender, sexual, and other psychiatric diagnoses are included here: cannabis dependence, history of polysubstance abuse, currently in remission.  These psychological diagnoses are well controlled.    The patient has followed the WPATH Standards of Care to a high degree and is likely to continue to comply. Clinical rationale for surgery is gender affirmation, to better align the external body with the internal gender.    Anthony has met all the eligibility criteria for the proposed surgery includin. Legal age of majority in the patient s nation.  2. If appropriate, 12 months of continuous hormonal therapy for those without a medical contraindication.  3. Living continuously in a gender role congruent with gender identity for greater than 12 months. WPATH standards do not demand this criteria, but this has been the community standard to assess resolve to fulfill an unambiguous transition.  4. If required by a mental health professional, regular responsible participation in psychotherapy, at a frequency determined jointly by the patient and the mental health professional. Psychotherapy per se is not an absolute eligibility criterion for surgery.  5. Availability of a mental health professional with which the patient has an established relationship; I am also  available to the patient and happy to accept a phone call or electronic health record message to establish the veracity of their relationships.   6. Discussion of the risks, benefits and alternatives to this surgical intervention. This means: demonstrable knowledge of the cost, required lengths of hospitalizations, likely complications, and post surgical rehabilitation requirements of various surgical approaches, and awareness of different competent surgeons.    These criteria are consistent with the World [employer] for Transgender Health s Standards of Care for the Health of Transsexual, Transgender and Gender Nonconforming People, and support this procedure as medically necessary for Cruz's overall health.      If you have any questions, please contact me or one of our care coordinators in clinic.       Respectfully,      Kim Mosher MD

## 2018-03-27 NOTE — PROGRESS NOTES
HPI:       Cristina Morrow is a 32 year old who presents for the following  Patient presents with:  Recheck Medication: Follow up  Hypertension    Hypertension Follow-up      Outpatient blood pressures are not being checked.    Chest Pain? :No     Low Salt Diet: no added salt    Daily NSAID Use?No     Did patient take their HTN pills today/last night as usual?  Yes       Adherence and Exercise  Medication side effects: no  How often is a medication missed? Less than 1 time per week  Exercise:walking  and strength training 4-5 days/week for an average of 30-45 minutes     Injection on Tuesdays. No significant changes. Has been on same dose of testosterone for several months. Is happy with the changes. Stable. No acute issues.     Would like to discuss Top Surgery and hysterectomy options.    Problem, Medication and Allergy Lists were   reviewed and are current.     Patient Active Problem List    Diagnosis Date Noted     Urinary tract infection 08/27/2017     Priority: Medium     Cannabis dependence (H) 04/10/2017     Priority: Medium     Acne vulgaris 04/10/2017     Priority: Medium     Benign essential hypertension 01/22/2016     Priority: Medium     Hx of substance abuse 01/22/2016     Priority: Medium     Methamphetamines  S/p outpatient ChemDep treatment 5/2017       Gender dysphoria 01/07/2016     Priority: Medium     Seen and evaluated by Dr. CALDERON Carroll. Patient also has a letter of support from psychologist to start HRT ([facility]). Pt appears as male and is publicly open about this. Concern for hx of IV substance abuse and suicide attempts, but is currently stable     ,     Current Outpatient Prescriptions   Medication Sig Dispense Refill     testosterone cypionate (DEPOTESTOTERONE) 200 MG/ML injection Inject 0.3 mLs (60 mg) into the muscle once a week 7 mL 1     amLODIPine (NORVASC) 5 MG tablet Take 1 tablet (5 mg) by mouth daily 30 tablet 2     syringe, disposable, 1 ML MISC Use to draw up testosterone 20 each  "3     Syringe/Needle, Disp, 18G X 1\" 1 ML MISC Use weekly in draw up testosterone 20 each 0     Needle, Disp, (BD ECLIPSE) 25G X 1-1/2\" MISC Use to inject Testosterone 20 each 3     Syringe/Needle, Disp, 22G X 1-1/2\" 1 ML MISC 1 each once a week 20 each 0     clindamycin-benzoyl peroxide (BENZACLIN) gel Apply topically 2 times daily (Patient not taking: Reported on 8/25/2017) 50 g 11     clotrimazole (LOTRIMIN) 1 % cream Apply to affected area two times daily. Also apply one week after lesions have disappeared. (Patient not taking: Reported on 8/25/2017) 15 g 0     Sharps Container MISC 1 each once a week 1 each 0   ,   No Known Allergies  Patient is an established patient of this clinic.         Review of Systems:   Review of Systems   Constitutional: Negative for chills, fatigue and fever.   HENT: Negative for congestion, ear pain, rhinorrhea, sinus pressure and sore throat.    Eyes: Negative for pain and visual disturbance.   Respiratory: Negative for cough and shortness of breath.    Cardiovascular: Negative for chest pain and leg swelling.   Gastrointestinal: Negative for abdominal pain, constipation, diarrhea, nausea and vomiting.   Genitourinary: Negative for difficulty urinating, dysuria, hematuria, menstrual problem, pelvic pain, vaginal bleeding and vaginal discharge.   Musculoskeletal: Negative for arthralgias and myalgias.   Skin: Negative for rash.   Neurological: Negative for light-headedness and headaches.             Physical Exam:   Patient Vitals for the past 24 hrs:   BP Temp Temp src Pulse Resp SpO2 Weight   03/27/18 1511 127/88 97.6  F (36.4  C) Oral 60 20 98 % 163 lb 6.4 oz (74.1 kg)     Body mass index is 23.45 kg/(m^2).  Vitals were reviewed and were normal     Physical Exam   Constitutional: She is oriented to person, place, and time. She appears well-developed and well-nourished. No distress.   HENT:   Head: Normocephalic and atraumatic.   Nose: Nose normal.   Mouth/Throat: Oropharynx is " clear and moist. No oropharyngeal exudate.   Eyes: Conjunctivae are normal. Pupils are equal, round, and reactive to light. Right eye exhibits no discharge. Left eye exhibits no discharge. No scleral icterus.   Neck: Normal range of motion. Neck supple. No thyromegaly present.   Cardiovascular: Normal rate, regular rhythm and normal heart sounds.  Exam reveals no gallop and no friction rub.    No murmur heard.  Pulmonary/Chest: Effort normal and breath sounds normal. No respiratory distress. She has no wheezes. She has no rales.   Abdominal: Soft. Bowel sounds are normal. She exhibits no mass. There is no tenderness.   Musculoskeletal: Normal range of motion. She exhibits no edema or tenderness.   Lymphadenopathy:     She has no cervical adenopathy.   Neurological: She is alert and oriented to person, place, and time.   Skin: Skin is warm. No rash noted. She is not diaphoretic.   Psychiatric: She has a normal mood and affect.         Results:     No labs this encounter.     Assessment and Plan     Anthony is a 33yo transgender male who presents to the clinic for HTN follow up and to discuss surgical options. Patient has been on HRT therapy for ~2 years, doing well with no concerns. Blood pressure is well controlled. He would like to discuss top and bottom surgery options. Patient is from the [place] region. Advised to make a consultation with our [facility] Team to discuss options. Placed consult in today.     Will provide letter of support for surgery today.     1. Benign essential hypertension  - amLODIPine (NORVASC) 5 MG tablet; Take 1 tablet (5 mg) by mouth daily  Dispense: 90 tablet; Refill: 3    2. Gender dysphoria in adult  - PLASTIC SURGERY REFERRAL - INTERNAL    There are no discontinued medications.  Options for treatment and follow-up care were reviewed with the patient. Cristina Morrow  engaged in the decision making process and verbalized understanding of the options discussed and agreed  with the final plan.    Kim Mosher MD  [facility]  [phone]

## 2018-04-01 ASSESSMENT — ENCOUNTER SYMPTOMS
DYSURIA: 0
ARTHRALGIAS: 0
FEVER: 0
SHORTNESS OF BREATH: 0
HEMATURIA: 0
RHINORRHEA: 0
LIGHT-HEADEDNESS: 0
SINUS PRESSURE: 0
DIFFICULTY URINATING: 0
CONSTIPATION: 0
EYE PAIN: 0
FATIGUE: 0
CHILLS: 0
DIARRHEA: 0
SORE THROAT: 0
HEADACHES: 0
ABDOMINAL PAIN: 0
VOMITING: 0
MYALGIAS: 0
COUGH: 0
NAUSEA: 0

## 2018-04-06 ENCOUNTER — TELEPHONE (OUTPATIENT)
Dept: FAMILY MEDICINE | Facility: CLINIC | Age: 33
End: 2018-04-06

## 2018-04-06 NOTE — TELEPHONE ENCOUNTER
[facility] phone call message- general phone call:    Reason for call: To notify the letter requested was completed and mailed     Return call needed: No    OK to leave a message on voice mail? None needed    Primary language: English      needed? No    Call taken on April 6, 2018 at 8:54 AM by Bailey Mallory

## 2018-07-23 DIAGNOSIS — F64.0 GENDER DYSPHORIA IN ADULT: ICD-10-CM

## 2018-07-23 NOTE — TELEPHONE ENCOUNTER

## 2018-09-27 DIAGNOSIS — F64.0 GENDER DYSPHORIA IN ADULT: ICD-10-CM

## 2018-10-02 RX ORDER — TESTOSTERONE CYPIONATE 200 MG/ML
60 INJECTION, SOLUTION INTRAMUSCULAR WEEKLY
Qty: 7 ML | Refills: 1 | Status: CANCELLED | OUTPATIENT
Start: 2018-10-02

## 2018-10-02 NOTE — TELEPHONE ENCOUNTER
Medication Refill Denied  Reason: Patient needs: provider visit  Provider: I have not called the patient about the Rx denial, please call.  PCS: Please notify the pharmacy, Please contact the patient to explain reasoning provided above and to schedule the patient for a provider visit with any provider..    Once patient makes an appointment please send back to me to    order temporary refill so that the patient will not run out of medication prior to the scheduled visit.    Pura العلي MD      PATIENT WAS SEEN IN CLINIC TODAY BY DR. GALICIA FOR REFILLS.

## 2018-10-04 DIAGNOSIS — F64.0 GENDER DYSPHORIA IN ADULT: ICD-10-CM

## 2018-10-04 NOTE — TELEPHONE ENCOUNTER

## 2018-10-05 RX ORDER — TESTOSTERONE CYPIONATE 200 MG/ML
60 INJECTION, SOLUTION INTRAMUSCULAR WEEKLY
Qty: 7 ML | Refills: 1 | Status: SHIPPED | OUTPATIENT
Start: 2018-10-05 | End: 2019-04-16

## 2018-10-05 NOTE — TELEPHONE ENCOUNTER
RN printed rx and had preceptor Dr. Ivan sign. Faxed to pharmacy. Fax successful    Mariam Crowder RN

## 2018-10-05 NOTE — TELEPHONE ENCOUNTER
Please refill testosterone per protocol.    Thank you,    Pura العلي MD  [facility]  PGY-2  Pager #: [phone]

## 2019-02-19 DIAGNOSIS — F64.0 GENDER DYSPHORIA IN ADULT: ICD-10-CM

## 2019-02-19 NOTE — TELEPHONE ENCOUNTER

## 2019-04-16 DIAGNOSIS — F64.0 GENDER DYSPHORIA IN ADULT: ICD-10-CM

## 2019-04-16 NOTE — TELEPHONE ENCOUNTER

## 2019-04-17 RX ORDER — TESTOSTERONE CYPIONATE 200 MG/ML
60 INJECTION, SOLUTION INTRAMUSCULAR WEEKLY
Qty: 7 ML | Refills: 3 | Status: SHIPPED | OUTPATIENT
Start: 2019-04-17 | End: 2019-10-28

## 2019-04-17 NOTE — TELEPHONE ENCOUNTER
Please refill and print current Testosterone dose.Please print, have preceptor sign and fax to Pharmacy on file    Matt Casey D.O.  [place] [address]  p-[phone]

## 2019-08-26 DIAGNOSIS — F64.0 GENDER DYSPHORIA IN ADULT: ICD-10-CM

## 2019-08-26 NOTE — TELEPHONE ENCOUNTER

## 2019-10-22 ENCOUNTER — TELEPHONE (OUTPATIENT)
Dept: FAMILY MEDICINE | Facility: CLINIC | Age: 34
End: 2019-10-22

## 2019-10-22 DIAGNOSIS — F64.0 GENDER DYSPHORIA IN ADULT: ICD-10-CM

## 2019-10-22 NOTE — TELEPHONE ENCOUNTER
"Called patient to notify the need for clinic office visit, unable to get hold or leave message-voice mail is full. If patient returns phone call, please notify PCP message, \"Pt needs office Visit to get further refill\" MD Severiano   and assist schedule appointment. Message routed to clinic  to follow up with appointment assistance.    Bambi Harrell RN        "

## 2019-10-22 NOTE — TELEPHONE ENCOUNTER

## 2019-10-22 NOTE — TELEPHONE ENCOUNTER
Received refill request for testosterone cypionate (DEPOTESTOSTERONE) 200 MG/ML injection, Rx- script was last written on 04/17/2019, Patient last seen in clinic on 03/27/2018, refill request routed to PCP to address/advise if appropriate, will continue to follow up.    Bambi Harrell RN

## 2019-10-22 NOTE — TELEPHONE ENCOUNTER
[facility] phone call message- patient requesting a refill:    Full Medication Name: testosterone cypionate (DEPOTESTOSTERONE) 200 MG/ML         Pharmacy confirmed as   [facility] [place] - [address] [place] [address] [address] [facility] [place] [address]  Phone: [phone] Fax: [phone]  : Yes    Additional Comments: Patient is out of medication     OK to leave a message on voice mail? Yes    Primary language: English      needed? No    Call taken on October 22, 2019 at 8:59 AM by Bronwyn Aguirre CMA

## 2019-10-28 ENCOUNTER — OFFICE VISIT (OUTPATIENT)
Dept: FAMILY MEDICINE | Facility: CLINIC | Age: 34
End: 2019-10-28

## 2019-10-28 VITALS
HEART RATE: 66 BPM | BODY MASS INDEX: 26.93 KG/M2 | HEIGHT: 69 IN | WEIGHT: 181.8 LBS | OXYGEN SATURATION: 97 % | DIASTOLIC BLOOD PRESSURE: 92 MMHG | TEMPERATURE: 97.7 F | SYSTOLIC BLOOD PRESSURE: 140 MMHG

## 2019-10-28 DIAGNOSIS — F64.0 GENDER DYSPHORIA IN ADULT: ICD-10-CM

## 2019-10-28 RX ORDER — LISINOPRIL 10 MG/1
10 TABLET ORAL DAILY
COMMUNITY
End: 2022-09-15

## 2019-10-28 RX ORDER — TESTOSTERONE CYPIONATE 200 MG/ML
60 INJECTION, SOLUTION INTRAMUSCULAR WEEKLY
Qty: 7 ML | Refills: 2 | Status: SHIPPED | OUTPATIENT
Start: 2019-10-28 | End: 2020-05-15

## 2019-10-28 ASSESSMENT — MIFFLIN-ST. JEOR: SCORE: 1601.76

## 2019-10-28 NOTE — PATIENT INSTRUCTIONS
"Here is the plan from today's visit    1. Gender dysphoria in adult  - Testosterone Total; Future  - Hepatic Panel; Future  - CBC with Diff Plt; Future  - Lipid Cascade; Future  - Glucose; Future  - Needle, Disp, (BD ECLIPSE) 25G X 1-1/2\" MISC; Use to inject Testosterone  Dispense: 20 each; Refill: 3  - Syringe/Needle, Disp, 18G X 1\" 1 ML MISC; Use weekly in draw up testosterone  Dispense: 20 each; Refill: 11  - testosterone cypionate (DEPOTESTOSTERONE) 200 MG/ML injection; Inject 0.3 mLs (60 mg) into the muscle once a week  Dispense: 7 mL; Refill: 2    Please call or return to clinic if your symptoms don't go away.    Follow up plan  Please make a clinic appointment for follow up with your primary physician Pura العلي MD in 1 year for repeats prescriptions.     Thank you for coming to [facility] today.  Lab Testing:  **If you had lab testing today and your results are reassuring or normal they will be mailed to you or sent through [employer] within 7 days.   **If the lab tests need quick action we will call you with the results.  The phone number we will call with results is # [phone] (home) . If this is not the best number please call our clinic and change the number.  Medication Refills:  If you need any refills please call your pharmacy and they will contact us.   If you need to  your refill at a new pharmacy, please contact the new pharmacy directly. The new pharmacy will help you get your medications transferred faster.   Scheduling:  If you have any concerns about today's visit or wish to schedule another appointment please call our office during normal business hours [phone] (8-5:00 M-F)  If a referral was made to a [facility] and you don't get a call from central scheduling please call [phone].  If a Mammogram was ordered for you at [facility] call [phone] to schedule or change your appointment.  If you had an XRay/CT/Ultrasound/MRI ordered " the number is [phone] to schedule or change your radiology appointment.   Medical Concerns:  If you have urgent medical concerns please call [phone] at any time of the day.    Pura العلي MD

## 2019-10-28 NOTE — PROGRESS NOTES
"          ANABEL Cruz is a 34 year old individual that uses pronouns He/Him/His/Himself that presents today for follow up of:  masculinizing hormone therapy. Gender identity: male.     Any special concerns today?  no current concerns    On hormones?  YES +++ Shot day of the week? Saturday      Due for labs?  No      +++ Refills of meds needed?  Yes  ---    History reviewed. No pertinent surgical history.    Patient Active Problem List   Diagnosis     Gender dysphoria     Benign essential hypertension     Hx of substance abuse (H)     Cannabis dependence (H)     Acne vulgaris     Urinary tract infection       Current Outpatient Medications   Medication Sig Dispense Refill     lisinopril (PRINIVIL/ZESTRIL) 10 MG tablet Take 10 mg by mouth daily       Needle, Disp, (BD ECLIPSE) 25G X 1-1/2\" MISC Use to inject Testosterone 20 each 3     Sharps Container MISC 1 each once a week 1 each 0     syringe, disposable, 1 ML MISC Use to draw up testosterone 20 each 3     Syringe/Needle, Disp, 18G X 1\" 1 ML MISC Use weekly in draw up testosterone 20 each 11     Syringe/Needle, Disp, 22G X 1-1/2\" 1 ML MISC 1 each once a week 20 each 0     testosterone cypionate (DEPOTESTOSTERONE) 200 MG/ML injection Inject 0.3 mLs (60 mg) into the muscle once a week 7 mL 3     amLODIPine (NORVASC) 5 MG tablet Take 1 tablet (5 mg) by mouth daily (Patient not taking: Reported on 10/28/2019) 90 tablet 3     clindamycin-benzoyl peroxide (BENZACLIN) gel Apply topically 2 times daily (Patient not taking: Reported on 8/25/2017) 50 g 11     clotrimazole (LOTRIMIN) 1 % cream Apply to affected area two times daily. Also apply one week after lesions have disappeared. (Patient not taking: Reported on 8/25/2017) 15 g 0       History   Smoking Status     Current Every Day Smoker   Smokeless Tobacco     Never Used        No Known Allergies    Problem, Medication and Allergy Lists were reviewed and updated if needed..         Review of Systems:    " "  General    Fat redistribution: no    Weight change: weight gain slightly HEENT    Voice change: more deep     Cardiovascular (CV)    Chest Pains: no    Shortness of breath: no Chest    Decreased exercise tolerance:  no    Breast changes/development: no     Gastrointestinal (GI)    Abdominal pain: no    Change in appetite: no Skin    Acne or oily skin: no    Change in hair: no     Genitourinary ()    Abnormal vaginal bleeding: no     Decreased spontaneous erections: not applicable    Change in libido: no    New sexual partners: no Musculoskeletal    Leg pain or swelling: no     Psychiatric (Psych)    Depression: yes however follows up with councelling.     Anxiety/Panic: yes follows up with counceling.     Mood:  better                    Physical Exam:     Vitals:    10/28/19 1317 10/28/19 1319   BP: (!) 148/105 (!) 140/92   Pulse: 66    Temp: 97.7  F (36.5  C)    TempSrc: Oral    SpO2: 97%    Weight: 82.5 kg (181 lb 12.8 oz)    Height: 1.765 m (5' 9.49\")      BMI= Body mass index is 26.47 kg/m .   Wt Readings from Last 10 Encounters:   10/28/19 82.5 kg (181 lb 12.8 oz)   03/27/18 74.1 kg (163 lb 6.4 oz)   12/18/17 75.3 kg (166 lb)   08/25/17 79 kg (174 lb 3.2 oz)   04/10/17 73.9 kg (163 lb)   11/14/16 75.2 kg (165 lb 12.8 oz)   08/19/16 73.6 kg (162 lb 3.2 oz)   04/18/16 78.3 kg (172 lb 9.6 oz)   01/26/16 74.7 kg (164 lb 9.6 oz)   01/22/16 76.3 kg (168 lb 3.2 oz)     Appearance: Male appearance and dress    GENERAL:: healthy, alert and no distress  EYES: Eyes grossly normal to inspection, fundi benign and PERRL  NECK: no adenopathy, no asymmetry, no masses,  and thyroid normal to palpation, supple  RESP: lungs clear to auscultation - no rales, no rhonchi, no wheezes  CV: regular rates and rhythm, normal S1 S2, no S3 or S4 and no murmur, no click or rub -  SKIN: no suspicious lesions, no rashes  Psych: Alert and oriented times 3; coherent speech, normal rate and volume, able to articulate logical thoughts, able " to abstract reason, no tangential thoughts, no hallucinations or delusions. Affect is Normal.  Affect: Appropriate/mood-congruent           Labs:   Results from last visit:  Office Visit on 12/18/2017   Component Date Value Ref Range Status     Testosterone Total 12/18/2017 611* 8 - 60 ng/dL Final    Comment: This test was developed and its performance characteristics determined by the   [facility],  [facility]. It has   not been cleared or approved by the FDA. The laboratory is regulated under   CLIA as qualified to perform high-complexity testing. This test is used for   clinical purposes. It should not be regarded as investigational or for   research.       Albumin 12/18/2017 4.7  3.8 - 5.0 mg/dL Final     Alkaline Phosphatase 12/18/2017 66.1  31.7 - 110.5 U/L Final     ALT 12/18/2017 25.8  0.0 - 45.0 U/L Final     AST 12/18/2017 23.5  0.0 - 45.0 U/L Final     Bilirubin Direct 12/18/2017 0.1  0.1 - 0.3 mg/dL Final     Bilirubin Total 12/18/2017 0.4  0.2 - 1.3 mg/dL Final     Protein Total 12/18/2017 7.8  6.8 - 8.8 g/dL Final     WBC 12/18/2017 6.6  4.0 - 11.0 K/uL Final     Lymphocytes # 12/18/2017 1.8  0.8 - 5.3 K/uL Final     % Lymphocytes 12/18/2017 27.9  20.0 - 48.0 %L Final     Mid # 12/18/2017 0.6  0.0 - 2.2 K/uL Final     Mid % 12/18/2017 8.8  0.0 - 20.0 %M Final     GRANULOCYTES # 12/18/2017 4.2  1.6 - 8.3 K/uL Final     % Granulocytes 12/18/2017 63.3  40.0 - 75.0 %G Final     RBC 12/18/2017 4.90  3.80 - 5.20 M/uL Final     Hemoglobin 12/18/2017 15.6  11.7 - 15.7 g/dL Final     Hematocrit 12/18/2017 48.4* 35.0 - 47.0 % Final     MCV 12/18/2017 98.7  78.0 - 100.0 fL Final     MCH 12/18/2017 31.8  26.5 - 35.0 pg Final     MCHC 12/18/2017 32.2  32.0 - 36.0 g/dL Final     Platelets 12/18/2017 302.0  150.0 - 450.0 K/uL Final     Cholesterol 12/18/2017 221.0* 0.0 - 200.0 mg/dL Final     Cholesterol/HDL Ratio 12/18/2017 3.3  0.0 - 5.0 Final     HDL Cholesterol  12/18/2017 68.0  >40.0 mg/dL Final     LDL Cholesterol Calculated 12/18/2017 124  0 - 129 mg/dL Final     Triglycerides 12/18/2017 145.7  0.0 - 150.0 mg/dL Final     VLDL Cholesterol 12/18/2017 29.1  7.0 - 32.0 mg/dL Final     Hemoglobin A1C 12/18/2017 5.2  4.1 - 5.7 % Final     Amphetamines Qual 12/18/2017 NEGATIVE  NEGATIVE Final     Barbiturates Qual Urine 12/18/2017 NEGATIVE  NEGATIVE Final     Buprenorphine Qual Urine 12/18/2017 NEGATIVE  NEGATIVE Final     Benzodiazepine Qual Urine 12/18/2017 NEGATIVE  NEGATIVE Final     Cocaine Qual Urine 12/18/2017 NEGATIVE  NEGATIVE Final     Cannabinoids Qual Urine 12/18/2017 POSITIVE* NEGATIVE Final     Methamphetamine Qual 12/18/2017 NEGATIVE  NEGATIVE Final     Methadone Qual 12/18/2017 NEGATIVE  NEGATIVE Final     Morphine Qual 12/18/2017 NEGATIVE  NEGATIVE Final     Oxycodone Qual 12/18/2017 NEGATIVE  NEGATIVE Final     Temperature of Urine was Between 9* 12/18/2017 YES  YES Final    Comment: This is a preliminary screening test that detects drugs-of-abuse in urine at   specified detection levels.  To confirm preliminary results, a more specific   method such as Gas Chromatography/Mass Spectrometry (GC/MS) must be used.              Assessment and Plan     1. Gender dysphoria in adult  Last seen 1.5 years ago and presenting to clinic for refills of testosterone. Stable testosterone levels in the past and unremarkable labs otherwise. Will plan to refill testosterone today (ran out of meds 3 weeks ago) with plans to recheck labs in 2 months from restarting meds. Will likely get labs done for from his personal free clinic and bring results in at next visit.   - Refilled testosterone 60mg weekly x 1 year supply.   - Testosterone Total; Future  - Hepatic Panel; Future  - CBC with Diff Plt; Future  - Lipid Cascade; Future  - Glucose; Future      Contraception:   abstinence  .   Counselled patient about controlled substances: Yes. Details: follows up with councelling (hx of  methamphetamines)    Follow up:  Follow up in 1 year.  Results by mychart  Questions were elicited and answered.     Pura العلي MD

## 2019-10-28 NOTE — PROGRESS NOTES
Preceptor Attestation:   Patient seen, evaluated and discussed with the resident. I have verified the content of the note, which accurately reflects my assessment of the patient and the plan of care.   Supervising Physician:  Jina Vargas MD

## 2020-03-10 ENCOUNTER — HEALTH MAINTENANCE LETTER (OUTPATIENT)
Age: 35
End: 2020-03-10

## 2020-03-16 DIAGNOSIS — F64.0 GENDER DYSPHORIA IN ADULT: ICD-10-CM

## 2020-03-16 RX ORDER — NEEDLES, SAFETY 18GX1 1/2"
NEEDLE, DISPOSABLE MISCELLANEOUS
Qty: 20 EACH | Refills: 3 | Status: SHIPPED | OUTPATIENT
Start: 2020-03-16

## 2020-03-16 NOTE — TELEPHONE ENCOUNTER

## 2020-05-15 DIAGNOSIS — F64.0 GENDER DYSPHORIA IN ADULT: ICD-10-CM

## 2020-05-15 RX ORDER — TESTOSTERONE CYPIONATE 200 MG/ML
60 INJECTION, SOLUTION INTRAMUSCULAR WEEKLY
Qty: 7 ML | Refills: 2 | Status: SHIPPED | OUTPATIENT
Start: 2020-05-15 | End: 2021-05-04

## 2020-05-15 NOTE — TELEPHONE ENCOUNTER
"Request for medication refill: Testosterone   Providers if patient needs an appointment and you are willing to give a one month supply please refill for one month and  send a letter/MyChart using \".SMILLIMITEDREFILL\" .smillimited and route chart to \"P SMI \" (Giving one month refill in non controlled medications is strongly recommended before denial)    If refill has been denied, meaning absolutely no refills without visit, please complete the smart phrase \".smirxrefuse\" and route it to the \"P SMI MED REFILLS\"  pool to inform the patient and the pharmacy.    Deborah Grove, CMA        "

## 2021-04-06 ENCOUNTER — TELEPHONE (OUTPATIENT)
Dept: FAMILY MEDICINE | Facility: CLINIC | Age: 36
End: 2021-04-06

## 2021-04-06 NOTE — TELEPHONE ENCOUNTER
Prior Authorization Retail Medication Request    Medication/Dose: testosterone cypionate (DEPOTESTOSTERONE) 200 MG/ML injection  ICD code (if different than what is on RX):    Previously Tried and Failed:    Rationale:      Insurance Name:  [facility]  Insurance ID:  [id number]       Pharmacy Information (if different than what is on RX)  Name:  [facility] - [address], [place] [address]  Phone:  [phone], fax [phone]

## 2021-04-07 NOTE — TELEPHONE ENCOUNTER
PA Initiation    Medication: testosterone cypionate (DEPOTESTOSTERONE) 200 MG/ML injection- APPROVED  Insurance Company: Other (see comments)  Pharmacy Filling the Rx: [facility] [address] [place] [address] [address] [facility] [address]  Filling Pharmacy Phone: [phone]  Filling Pharmacy Fax:    Start Date: 4/7/2021

## 2021-04-07 NOTE — TELEPHONE ENCOUNTER
Prior Authorization Approval    Authorization Effective Date: 4/7/2021  Authorization Expiration Date: 4/7/2022  Medication: testosterone cypionate (DEPOTESTOSTERONE) 200 MG/ML injection- APPROVED  Approved Dose/Quantity: 7 ML   Reference #: [id number]   Insurance Company: Other (see comments)  Expected CoPay:       CoPay Card Available:      Foundation Assistance Needed:    Which Pharmacy is filling the prescription (Not needed for infusion/clinic administered): [facility] [address] [place] [address] [address]  Pharmacy Notified: Yes  Patient Notified:                Central Prior Authorization Team  Phone: [phone]

## 2021-04-23 ENCOUNTER — TELEPHONE (OUTPATIENT)
Dept: FAMILY MEDICINE | Facility: CLINIC | Age: 36
End: 2021-04-23

## 2021-04-23 DIAGNOSIS — F64.0 GENDER DYSPHORIA IN ADULT: ICD-10-CM

## 2021-04-23 NOTE — TELEPHONE ENCOUNTER
Pt has not been seen since 2019 and no labs on file sine 2017. RN called pt to relay appt needed. Pt states they moved to [place] so can't set up appt. RN asked if they have transferred care. Pt states they found a place but are a month out.     RN stated we can't fill without appt and/or labs. If pt can find a place to have labs drawn can ask dr nieves to place updated lab orders to have labs drawn and perhaps give one months upply    Mariam Crowder RN

## 2021-04-23 NOTE — TELEPHONE ENCOUNTER
Ab orders updated. Pt will call back if finds lab in [place] to fax orders    Mariam Crowder RN

## 2021-04-23 NOTE — TELEPHONE ENCOUNTER
Verify that the refill encounter hasn't been started Yes    [facility] phone call message- patient requesting a refill:    Full Medication Name:   testosterone cypionate (DEPOTESTOSTERONE) 200 MG/ML injection    Dose: Inject 0.3 mLs (60 mg) into the muscle once a week - Intramuscular     Pharmacy confirmed as   [facility] [place] - [address] [place] [address]  [address] [facility] [place] [address]  Phone: [phone] Fax: [phone]  : Yes    Medication tab checked to see if medication has been sent  Yes    Additional Comments: Pt out of medication- would like prescription sent to above pharmacy      OK to leave a message on voice mail? Yes    Advised patient refill may take up to 2 business days? No:     Primary language: English      needed? No    Call taken on April 23, 2021 at 1:23 PM by Jenny Mello    Route to  SMI MED REFILL

## 2021-04-29 DIAGNOSIS — F64.0 GENDER DYSPHORIA IN ADULT: ICD-10-CM

## 2021-04-29 NOTE — TELEPHONE ENCOUNTER
"Request for medication refill: Tuberculin     Providers if patient needs an appointment and you are willing to give a one month supply please refill for one month and  send a letter/MyChart using \".SMILLIMITEDREFILL\" .smillimited and route chart to \"P SMI \" (Giving one month refill in non controlled medications is strongly recommended before denial)    If refill has been denied, meaning absolutely no refills without visit, please complete the smart phrase \".smirxrefuse\" and route it to the \"P SMI MED REFILLS\"  pool to inform the patient and the pharmacy.    Deborah Grove, CMA        "

## 2021-05-04 DIAGNOSIS — F64.0 GENDER DYSPHORIA IN ADULT: ICD-10-CM

## 2021-05-04 RX ORDER — TESTOSTERONE CYPIONATE 200 MG/ML
60 INJECTION, SOLUTION INTRAMUSCULAR WEEKLY
Qty: 5 ML | Refills: 0 | Status: SHIPPED | OUTPATIENT
Start: 2021-05-04 | End: 2022-06-17

## 2021-05-04 NOTE — TELEPHONE ENCOUNTER
"Request for medication refill:Testosterone     Providers if patient needs an appointment and you are willing to give a one month supply please refill for one month and  send a letter/MyChart using \".SMILLIMITEDREFILL\" .smillimited and route chart to \"P SMI \" (Giving one month refill in non controlled medications is strongly recommended before denial)    If refill has been denied, meaning absolutely no refills without visit, please complete the smart phrase \".smirxrefuse\" and route it to the \"P SMI MED REFILLS\"  pool to inform the patient and the pharmacy.    Deborah Grove, CMA        "

## 2021-11-15 ENCOUNTER — TELEPHONE (OUTPATIENT)
Dept: FAMILY MEDICINE | Facility: CLINIC | Age: 36
End: 2021-11-15

## 2021-11-15 NOTE — TELEPHONE ENCOUNTER
"Reached out to patient to schedule a follow up visit with , per ITM. Patient was last seen in 2019. Patient stated that they \"are in [place] and not sure if I will be coming back\". Patient states that they \"will call [facility] to let us know\".    Serena Quintero  Care Coordinator     "

## 2022-06-17 ENCOUNTER — OFFICE VISIT (OUTPATIENT)
Dept: FAMILY MEDICINE | Facility: CLINIC | Age: 37
End: 2022-06-17

## 2022-06-17 ENCOUNTER — TELEPHONE (OUTPATIENT)
Dept: FAMILY MEDICINE | Facility: CLINIC | Age: 37
End: 2022-06-17

## 2022-06-17 VITALS
BODY MASS INDEX: 22.22 KG/M2 | SYSTOLIC BLOOD PRESSURE: 140 MMHG | WEIGHT: 150 LBS | HEIGHT: 69 IN | OXYGEN SATURATION: 100 % | HEART RATE: 70 BPM | TEMPERATURE: 97.5 F | DIASTOLIC BLOOD PRESSURE: 81 MMHG

## 2022-06-17 DIAGNOSIS — F64.0 GENDER DYSPHORIA IN ADULT: ICD-10-CM

## 2022-06-17 PROCEDURE — 99214 OFFICE O/P EST MOD 30 MIN: CPT | Performed by: FAMILY MEDICINE

## 2022-06-17 RX ORDER — TESTOSTERONE CYPIONATE 200 MG/ML
50 INJECTION, SOLUTION INTRAMUSCULAR WEEKLY
Qty: 5 ML | Refills: 0 | Status: SHIPPED | OUTPATIENT
Start: 2022-06-17 | End: 2022-09-15

## 2022-06-17 NOTE — TELEPHONE ENCOUNTER
[facility] phone call message- medication clarification/question:    Full Medication Name: testosterone cypionate (DEPOTESTOSTERONE) 200 MG/ML injection        Question: According to the pharmacist, the patient was prescribed the 5ml of this medication, but the pharmacist states the 10 ml is the best option; needs the okay to dispense.    Pharmacy confirmed as      [facility] - [place] - [address] [place] [address] [address]ARM[address] [place] [address] [address] [facility] [place] [facility] [address] [facility] [address]    : Yes    OK to leave a message on voice mail? Yes    Primary language: English      needed? No    Call taken on June 17, 2022 at 2:08 PM by Leonor Garcia

## 2022-06-17 NOTE — TELEPHONE ENCOUNTER
Patient seen by Dr Forrester; since it is a controlled substance cannot adjust script. If they call back and can take a verbal not from Dr Forrester, since these are single use vials usually we've dispensed 5 1mL vials.    Sindy Coto MD

## 2022-06-28 NOTE — PROGRESS NOTES
"  Assessment & Plan     Gender dysphoria in adult  Patient reestablishing with long term HRT  Brief intermission from HRT due to YULIET  Recheck labs and start previous dose  - testosterone cypionate (DEPOTESTOSTERONE) 200 MG/ML injection; Inject 0.25 mLs (50 mg) into the muscle once a week  - CBC with platelets; Future  - Comprehensive metabolic panel; Future  - Testosterone total; Future  - Lipid panel; Future      Return in about 3 months (around 9/17/2022).    Nya Forrester DO  [facility] CORNELIUS Cruz is a 37 year old, presenting for the following health issues:  Consult (Gender care support, HRT and refill meds ) and Establish Care (Re-establishing care )      HPI     Re-establishing HRT treatment   Has been on testosterone long term  Brief intermission due to YULIET  Would like to check labs and restart at former dosage    Review of Systems   Constitutional, HEENT, cardiovascular, pulmonary, gi and gu systems are negative, except as otherwise noted.      Objective    BP (!) 140/81   Pulse 70   Temp 97.5  F (36.4  C) (Oral)   Ht 1.76 m (5' 9.29\")   Wt 68 kg (150 lb)   LMP  (LMP Unknown)   SpO2 100%   Breastfeeding No   BMI 21.97 kg/m    Body mass index is 21.97 kg/m .  Physical Exam   GENERAL: healthy, alert and no distress  NECK: no adenopathy, no asymmetry, masses, or scars and thyroid normal to palpation  RESP: lungs clear to auscultation - no rales, rhonchi or wheezes  CV: regular rate and rhythm, normal S1 S2, no S3 or S4, no murmur, click or rub, no peripheral edema and peripheral pulses strong  MS: no gross musculoskeletal defects noted, no edema              .  ..  "

## 2022-08-24 ENCOUNTER — TRANSFERRED RECORDS (OUTPATIENT)
Dept: HEALTH INFORMATION MANAGEMENT | Facility: CLINIC | Age: 37
End: 2022-08-24

## 2022-09-15 ENCOUNTER — VIRTUAL VISIT (OUTPATIENT)
Dept: FAMILY MEDICINE | Facility: CLINIC | Age: 37
End: 2022-09-15

## 2022-09-15 DIAGNOSIS — I10 PRIMARY HYPERTENSION: ICD-10-CM

## 2022-09-15 DIAGNOSIS — F64.0 GENDER DYSPHORIA IN ADULT: Primary | ICD-10-CM

## 2022-09-15 PROCEDURE — 99214 OFFICE O/P EST MOD 30 MIN: CPT | Mod: 95 | Performed by: STUDENT IN AN ORGANIZED HEALTH CARE EDUCATION/TRAINING PROGRAM

## 2022-09-15 RX ORDER — TESTOSTERONE CYPIONATE 200 MG/ML
50 INJECTION, SOLUTION INTRAMUSCULAR WEEKLY
Qty: 5 ML | Refills: 3 | Status: SHIPPED | OUTPATIENT
Start: 2022-09-15

## 2022-09-15 NOTE — PATIENT INSTRUCTIONS
It was silvia to meet you,     I will have us reach out to your PCP and try and get those labs. We should get these, then one set in 6 months, after which (if all looks well) we can just ask that these get done with your yearly check up. I'll send your refills for the next 6 months (in 3 months segments) and please let me know if you have any questions.    Thank you kindly,   JESSICA Zuniga

## 2022-09-15 NOTE — PROGRESS NOTES
Anthony is a 37 year old who is being evaluated via a billable video visit.      How would you like to obtain your AVS? MyChart  If the video visit is dropped, the invitation should be resent by: Text to cell phone: [phone]  Will anyone else be joining your video visit? No    Assessment & Plan     Gender dysphoria in adult  Patient seeing all the results they want, feels good on a stable dose, has been on HRT >4 years. Will do q6mo checks x2 since we do not have labs for some time then transition to yearly. No adjustment to therapies. Discussed what labs we would make adjustments in regimen for, why I need to check lab values for monitoring of drug toxicity. All questions were answered.     Primary hypertension  Being managed by his PCP at [facility]. Off all medications, being tracked by his PCP- last taken was reportedly WNL. No intervention from our clinic and we defer this management to his primary team.     Patient is a member of the LGBT community, this is a social determinants of health that impacts how they interact with the medical system.     I will reach out when I have labs back.    Follow up every 6 mo for labs.    No follow-ups on file.    Sindy Coto MD  [facility] CORNELIUS Cruz is a 37 year old, presenting for the following health issues:  RECHECK (HRT follow up. No specific questions or concerns.)      HPI     Seeing everything they want. NO difficulty getting meds or taking meds- get labs done at [facility] and is under the impression that they were sent to us. I don't see a record of this but we will reach out.     Seeing [facility] for primary care-  They are aware he is on HRT. They are managing his HTN, currently managed by Beet supplements and no other medications.     Review of Systems   See HPI      Objective           Vitals:  No vitals were obtained today due to virtual visit.    Physical Exam   GENERAL: Healthy, alert and no distress  EYES: Eyes grossly normal to  inspection.  No discharge or erythema, or obvious scleral/conjunctival abnormalities.  RESP: No audible wheeze, cough, or visible cyanosis.  No visible retractions or increased work of breathing.    SKIN: Visible skin clear. No significant rash, abnormal pigmentation or lesions.  NEURO: Cranial nerves grossly intact.  Mentation and speech appropriate for age.  PSYCH: Mentation appears normal, affect normal/bright, judgement and insight intact, normal speech and appearance well-groomed.          Video-Visit Details    Video Start Time: 1:05 PM    Type of service:  Video Visit    Video End Time:1:18 PM    Originating Location (pt. Location): Home    [facility]     Platform used for Video Visit: [employer]era

## 2022-10-21 NOTE — RESULT ENCOUNTER NOTE
HRT Labs from outside facility:   We do not have T level.   Hgb, LFTs, kidney function within safe limits.  Cholesterol at goal.